# Patient Record
Sex: MALE | Race: WHITE | NOT HISPANIC OR LATINO | URBAN - METROPOLITAN AREA
[De-identification: names, ages, dates, MRNs, and addresses within clinical notes are randomized per-mention and may not be internally consistent; named-entity substitution may affect disease eponyms.]

---

## 2018-01-12 ENCOUNTER — EMERGENCY (EMERGENCY)
Facility: HOSPITAL | Age: 58
LOS: 1 days | Discharge: HOME | End: 2018-01-12
Attending: EMERGENCY MEDICINE | Admitting: EMERGENCY MEDICINE
Payer: MEDICARE

## 2018-01-12 VITALS
TEMPERATURE: 98 F | OXYGEN SATURATION: 97 % | SYSTOLIC BLOOD PRESSURE: 112 MMHG | DIASTOLIC BLOOD PRESSURE: 70 MMHG | HEART RATE: 67 BPM | RESPIRATION RATE: 16 BRPM

## 2018-01-12 VITALS
TEMPERATURE: 98 F | HEIGHT: 72 IN | DIASTOLIC BLOOD PRESSURE: 71 MMHG | RESPIRATION RATE: 16 BRPM | OXYGEN SATURATION: 97 % | SYSTOLIC BLOOD PRESSURE: 110 MMHG | WEIGHT: 195.11 LBS | HEART RATE: 68 BPM

## 2018-01-12 PROCEDURE — 99283 EMERGENCY DEPT VISIT LOW MDM: CPT | Mod: 25

## 2018-01-12 PROCEDURE — 96372 THER/PROPH/DIAG INJ SC/IM: CPT

## 2018-01-12 PROCEDURE — 99284 EMERGENCY DEPT VISIT MOD MDM: CPT

## 2018-01-12 RX ORDER — TRAMADOL HYDROCHLORIDE 50 MG/1
1 TABLET ORAL
Qty: 15 | Refills: 0
Start: 2018-01-12

## 2018-01-12 RX ORDER — KETOROLAC TROMETHAMINE 30 MG/ML
60 SYRINGE (ML) INJECTION ONCE
Qty: 0 | Refills: 0 | Status: DISCONTINUED | OUTPATIENT
Start: 2018-01-12 | End: 2018-01-12

## 2018-01-12 RX ADMIN — Medication 60 MILLIGRAM(S): at 18:02

## 2018-01-12 NOTE — ED ADULT NURSE NOTE - OBJECTIVE STATEMENT
back pain since last week, got medicine and was better, today I took the tree out and hurt my back again

## 2018-01-12 NOTE — ED PROVIDER NOTE - MEDICAL DECISION MAKING DETAILS
Pt with lumbar disk disease injured back last week. Improved with Steroid pack. Reinjured back yesterday while removing Elisabeth lights. Requesting Rx for steroids and pain meds. PE unrevealing. Plan RX steroids, NSAIDS, Tramadol. FU Neurologist on Monday as planned.

## 2018-01-12 NOTE — ED ADULT NURSE REASSESSMENT NOTE - NS ED NURSE REASSESS COMMENT FT1
patient's wound is tx with antibiotic ointment and dressing, denies any pain, swelling appears better, Pt is in no acute distress.

## 2018-01-12 NOTE — ED PROVIDER NOTE - OBJECTIVE STATEMENT
58 y/o M pt w/ PMHx chronic back pain, herniated disc (L4/L5) and two bulging discs no significant PMHx presents to the ED c/o increased lower back pain today. Pt states he went to urgent care for nasal drip, when he was getting out of the waiting room chair he started experiencing increased back pain, was Rxd Prednisone and Flexeril, states he was feeling better. Today, pt states he was taking down his samira tree and felt increased pain in his back again, took Flexeril PTA. States pain is worse with movement. Denies taking pain medication PTA. Pt states he has an appointment with his neurologist in 3 days, last time he was seen by neurologist was 1 1/2 years ago when an MRI was performed. Pt denies bladder or bowel dysfunction, numbness, tingling or any other complaints at this time.    Neurologist: Dr. Miryam Batres

## 2021-10-10 ENCOUNTER — EMERGENCY (EMERGENCY)
Facility: HOSPITAL | Age: 61
LOS: 1 days | Discharge: HOME | End: 2021-10-10
Attending: EMERGENCY MEDICINE | Admitting: EMERGENCY MEDICINE
Payer: MEDICARE

## 2021-10-10 VITALS
TEMPERATURE: 98 F | DIASTOLIC BLOOD PRESSURE: 73 MMHG | RESPIRATION RATE: 16 BRPM | HEART RATE: 68 BPM | WEIGHT: 184.97 LBS | HEIGHT: 72 IN | OXYGEN SATURATION: 97 % | SYSTOLIC BLOOD PRESSURE: 118 MMHG

## 2021-10-10 VITALS
DIASTOLIC BLOOD PRESSURE: 61 MMHG | TEMPERATURE: 99 F | SYSTOLIC BLOOD PRESSURE: 101 MMHG | OXYGEN SATURATION: 98 % | HEART RATE: 61 BPM | RESPIRATION RATE: 16 BRPM

## 2021-10-10 PROCEDURE — 99284 EMERGENCY DEPT VISIT MOD MDM: CPT

## 2021-10-10 PROCEDURE — 96372 THER/PROPH/DIAG INJ SC/IM: CPT

## 2021-10-10 PROCEDURE — 99283 EMERGENCY DEPT VISIT LOW MDM: CPT | Mod: 25

## 2021-10-10 RX ORDER — DIAZEPAM 5 MG
5 TABLET ORAL ONCE
Refills: 0 | Status: DISCONTINUED | OUTPATIENT
Start: 2021-10-10 | End: 2021-10-10

## 2021-10-10 RX ORDER — OXYCODONE AND ACETAMINOPHEN 5; 325 MG/1; MG/1
1 TABLET ORAL ONCE
Refills: 0 | Status: DISCONTINUED | OUTPATIENT
Start: 2021-10-10 | End: 2021-10-10

## 2021-10-10 RX ORDER — LIDOCAINE 4 G/100G
1 CREAM TOPICAL ONCE
Refills: 0 | Status: COMPLETED | OUTPATIENT
Start: 2021-10-10 | End: 2021-10-10

## 2021-10-10 RX ORDER — KETOROLAC TROMETHAMINE 30 MG/ML
30 SYRINGE (ML) INJECTION ONCE
Refills: 0 | Status: DISCONTINUED | OUTPATIENT
Start: 2021-10-10 | End: 2021-10-10

## 2021-10-10 RX ADMIN — Medication 30 MILLIGRAM(S): at 11:57

## 2021-10-10 RX ADMIN — LIDOCAINE 1 PATCH: 4 CREAM TOPICAL at 11:58

## 2021-10-10 RX ADMIN — Medication 30 MILLIGRAM(S): at 12:37

## 2021-10-10 RX ADMIN — OXYCODONE AND ACETAMINOPHEN 1 TABLET(S): 5; 325 TABLET ORAL at 14:00

## 2021-10-10 RX ADMIN — OXYCODONE AND ACETAMINOPHEN 1 TABLET(S): 5; 325 TABLET ORAL at 13:11

## 2021-10-10 RX ADMIN — Medication 5 MILLIGRAM(S): at 11:58

## 2021-10-10 NOTE — ED PROVIDER NOTE - PATIENT PORTAL LINK FT
You can access the FollowMyHealth Patient Portal offered by [facility] by registering at the following website: [email or web address]. By joining [employer]’[facility] FollowMyHealth portal, you will also be able to view your health information using other applications (apps) compatible with our system.

## 2021-10-10 NOTE — ED ADULT NURSE NOTE - NSICDXPASTMEDICALHX_GEN_ALL_CORE_FT
PAST MEDICAL HISTORY:  Depressive disorder     Lumbar herniated disc     Primary lateral sclerosis

## 2021-10-10 NOTE — ED ADULT NURSE NOTE - OBJECTIVE STATEMENT
62 yo male presents to the ED with complaints of lower mid back pain, pt states he has a hx of  herniated disc,  primary lateral sclerosis in which he uses a cane, reports waking up one morning and while getting out of bed he fekt a pain cant recall twisting  he went to  who sent him with naproxen and flexeril, pt states meds did not help with pain, reports unable to take a shower this morning as the pain was debilitating.

## 2021-10-10 NOTE — ED PROVIDER NOTE - NEUROLOGICAL, MLM
Alert and oriented, no focal deficits, no motor or sensory deficits. LE strength 5/5 bilateral LE. no saddle anesthesia. Patient ambulating with cane at baseline.

## 2021-10-10 NOTE — ED PROVIDER NOTE - CLINICAL SUMMARY MEDICAL DECISION MAKING FREE TEXT BOX
62 yo male with h/o primary lateral sclerosis, lumbar herniated/bulging discs presents to the ED c/o bilateral lower back pain after getting up from a chair yesterday. Patient admits to chronic back pain, has followed up with pain management for epidural injections and PT. Patient ambulates with cane at baseline. Patient tried taking naproxen and flexeril yesterday with minimal improvement. Denies fever, chills, chest pain, sob, abd pain, N/V, urinary sxs, flank pain. no UE/LE weakness or paresthesias, no saddle anesthesia, no bowel or bladder incontinence/retention, no prior back surgery, no h/o IVDA. no fall or trauma. PAtietn states pain is minimal at rest, worse when going from sitting to standing. PE: as above. a/p: no back pain red flag signs or symptoms. Pain improved with meds. Will dc with percocet. Recommend spine/ortho, pain management and PT follow up. Patient understands return precautions.

## 2021-10-10 NOTE — ED PROVIDER NOTE - CARE PROVIDER_API CALL
Mychal Lipscomb)  Orthopaedic Surgery  [address] [place] [address], [address]  [place] [address]  Phone: [phone]  Fax: [phone]  Follow Up Time: 1-3 Days

## 2021-10-10 NOTE — ED PROVIDER NOTE - MUSCULOSKELETAL MINIMAL EXAM
+ TTP bilateral lumbar paraspinal tenderness. no midline vertebral tenderness. decreased ROM of back due to pain. dp pulses equal and intact bilaterally.

## 2021-10-10 NOTE — ED PROVIDER NOTE - PROGRESS NOTE DETAILS
Patt WONG for ED attending, Dr. Fraser; 60 y/o w/ PMHx of primary lateral sclerosis, lumbar herniate disc presents to ED c/o lower back pain. Pt repots this pain is chronic that flares up when he moves the wrong way or does heavy lifting. Pt reports he went to pain management at [facility] where he was given epidurals that improved his pain. Pt takes Cymbalta at home. Pt does not take pain medication at home. Pt walks w/ cane at baseline. On exam: vital signs normal, afebrile and in no distress, no spinal tenderness full ROM intact, extremities non deformed nontender, neuro generalized weakness 2/2 ALS no acute focal finding. Impression exacerbation of chronic back pain. Plan pain medication and f/u neurology tomorrow as discussed.

## 2021-10-10 NOTE — ED PROVIDER NOTE - OBJECTIVE STATEMENT
62 yo male with h/o primary lateral sclerosis 60 yo male with h/o primary lateral sclerosis, lumbar herniated/bulging discs presents to the ED c/o bilateral lower back pain after getting up from a chair yesterday. Patient admits to chronic back pain, has followed up with pain management for epidural injections and PT. Patient ambulates with cane at baseline. Patient tried taking naproxen and flexeril yesterday with minimal improvement. Denies fever, chills, chest pain, sob, abd pain, N/V, urinary sxs, flank pain. no UE/LE weakness or paresthesias, no saddle anesthesia, no bowel or bladder incontinence/retention, no prior back surgery, no h/o IVDA. no fall or trauma. PAtietn states pain is minimal at rest, worse when going from sitting to standing.

## 2022-07-04 ENCOUNTER — INPATIENT (INPATIENT)
Facility: HOSPITAL | Age: 62
LOS: 2 days | Discharge: HOME | DRG: 392 | End: 2022-07-07
Attending: HOSPITALIST | Admitting: HOSPITALIST
Payer: MEDICARE

## 2022-07-04 VITALS
RESPIRATION RATE: 18 BRPM | TEMPERATURE: 98 F | OXYGEN SATURATION: 99 % | SYSTOLIC BLOOD PRESSURE: 121 MMHG | HEIGHT: 72 IN | HEART RATE: 77 BPM | WEIGHT: 191.8 LBS | DIASTOLIC BLOOD PRESSURE: 75 MMHG

## 2022-07-04 DIAGNOSIS — K57.92 DIVERTICULITIS OF INTESTINE, PART UNSPECIFIED, WITHOUT PERFORATION OR ABSCESS WITHOUT BLEEDING: ICD-10-CM

## 2022-07-04 LAB
ALBUMIN SERPL ELPH-MCNC: 3.3 G/DL — SIGNIFICANT CHANGE UP (ref 3.3–5)
ALP SERPL-CCNC: 61 U/L — SIGNIFICANT CHANGE UP (ref 30–120)
ALT FLD-CCNC: 22 U/L DA — SIGNIFICANT CHANGE UP (ref 10–60)
ANION GAP SERPL CALC-SCNC: 5 MMOL/L — SIGNIFICANT CHANGE UP (ref 5–17)
APPEARANCE UR: CLEAR — SIGNIFICANT CHANGE UP
AST SERPL-CCNC: 16 U/L — SIGNIFICANT CHANGE UP (ref 10–40)
BASOPHILS # BLD AUTO: 0.05 K/UL — SIGNIFICANT CHANGE UP (ref 0–0.2)
BASOPHILS NFR BLD AUTO: 0.5 % — SIGNIFICANT CHANGE UP (ref 0–2)
BILIRUB SERPL-MCNC: 0.5 MG/DL — SIGNIFICANT CHANGE UP (ref 0.2–1.2)
BILIRUB UR-MCNC: NEGATIVE — SIGNIFICANT CHANGE UP
BUN SERPL-MCNC: 21 MG/DL — SIGNIFICANT CHANGE UP (ref 7–23)
CALCIUM SERPL-MCNC: 9.2 MG/DL — SIGNIFICANT CHANGE UP (ref 8.4–10.5)
CHLORIDE SERPL-SCNC: 102 MMOL/L — SIGNIFICANT CHANGE UP (ref 96–108)
CO2 SERPL-SCNC: 28 MMOL/L — SIGNIFICANT CHANGE UP (ref 22–31)
COLOR SPEC: YELLOW — SIGNIFICANT CHANGE UP
CREAT SERPL-MCNC: 0.94 MG/DL — SIGNIFICANT CHANGE UP (ref 0.5–1.3)
DIFF PNL FLD: ABNORMAL
EGFR: 92 ML/MIN/1.73M2 — SIGNIFICANT CHANGE UP
EOSINOPHIL # BLD AUTO: 0.36 K/UL — SIGNIFICANT CHANGE UP (ref 0–0.5)
EOSINOPHIL NFR BLD AUTO: 3.4 % — SIGNIFICANT CHANGE UP (ref 0–6)
GLUCOSE SERPL-MCNC: 115 MG/DL — HIGH (ref 70–99)
GLUCOSE UR QL: NEGATIVE MG/DL — SIGNIFICANT CHANGE UP
HCT VFR BLD CALC: 36.1 % — LOW (ref 39–50)
HGB BLD-MCNC: 12.2 G/DL — LOW (ref 13–17)
IMM GRANULOCYTES NFR BLD AUTO: 0.4 % — SIGNIFICANT CHANGE UP (ref 0–1.5)
KETONES UR-MCNC: ABNORMAL
LEUKOCYTE ESTERASE UR-ACNC: ABNORMAL
LIDOCAIN IGE QN: 113 U/L — SIGNIFICANT CHANGE UP (ref 73–393)
LYMPHOCYTES # BLD AUTO: 1.61 K/UL — SIGNIFICANT CHANGE UP (ref 1–3.3)
LYMPHOCYTES # BLD AUTO: 15.2 % — SIGNIFICANT CHANGE UP (ref 13–44)
MCHC RBC-ENTMCNC: 29.7 PG — SIGNIFICANT CHANGE UP (ref 27–34)
MCHC RBC-ENTMCNC: 33.8 GM/DL — SIGNIFICANT CHANGE UP (ref 32–36)
MCV RBC AUTO: 87.8 FL — SIGNIFICANT CHANGE UP (ref 80–100)
MONOCYTES # BLD AUTO: 1.28 K/UL — HIGH (ref 0–0.9)
MONOCYTES NFR BLD AUTO: 12.1 % — SIGNIFICANT CHANGE UP (ref 2–14)
NEUTROPHILS # BLD AUTO: 7.22 K/UL — SIGNIFICANT CHANGE UP (ref 1.8–7.4)
NEUTROPHILS NFR BLD AUTO: 68.4 % — SIGNIFICANT CHANGE UP (ref 43–77)
NITRITE UR-MCNC: NEGATIVE — SIGNIFICANT CHANGE UP
NRBC # BLD: 0 /100 WBCS — SIGNIFICANT CHANGE UP (ref 0–0)
PH UR: 5 — SIGNIFICANT CHANGE UP (ref 5–8)
PLATELET # BLD AUTO: 163 K/UL — SIGNIFICANT CHANGE UP (ref 150–400)
POTASSIUM SERPL-MCNC: 4.3 MMOL/L — SIGNIFICANT CHANGE UP (ref 3.5–5.3)
POTASSIUM SERPL-SCNC: 4.3 MMOL/L — SIGNIFICANT CHANGE UP (ref 3.5–5.3)
PROT SERPL-MCNC: 7 G/DL — SIGNIFICANT CHANGE UP (ref 6–8.3)
PROT UR-MCNC: 15 MG/DL
RBC # BLD: 4.11 M/UL — LOW (ref 4.2–5.8)
RBC # FLD: 12.1 % — SIGNIFICANT CHANGE UP (ref 10.3–14.5)
SODIUM SERPL-SCNC: 135 MMOL/L — SIGNIFICANT CHANGE UP (ref 135–145)
SP GR SPEC: 1.02 — SIGNIFICANT CHANGE UP (ref 1.01–1.02)
UROBILINOGEN FLD QL: NEGATIVE MG/DL — SIGNIFICANT CHANGE UP
WBC # BLD: 10.56 K/UL — HIGH (ref 3.8–10.5)
WBC # FLD AUTO: 10.56 K/UL — HIGH (ref 3.8–10.5)

## 2022-07-04 PROCEDURE — 99223 1ST HOSP IP/OBS HIGH 75: CPT | Mod: AI

## 2022-07-04 PROCEDURE — 74177 CT ABD & PELVIS W/CONTRAST: CPT | Mod: 26,MA

## 2022-07-04 PROCEDURE — 99285 EMERGENCY DEPT VISIT HI MDM: CPT | Mod: FS

## 2022-07-04 PROCEDURE — 99223 1ST HOSP IP/OBS HIGH 75: CPT | Mod: FS

## 2022-07-04 RX ORDER — ONDANSETRON 8 MG/1
4 TABLET, FILM COATED ORAL EVERY 8 HOURS
Refills: 0 | Status: DISCONTINUED | OUTPATIENT
Start: 2022-07-04 | End: 2022-07-07

## 2022-07-04 RX ORDER — DULOXETINE HYDROCHLORIDE 30 MG/1
0 CAPSULE, DELAYED RELEASE ORAL
Qty: 0 | Refills: 0 | DISCHARGE

## 2022-07-04 RX ORDER — SODIUM CHLORIDE 9 MG/ML
1000 INJECTION, SOLUTION INTRAVENOUS
Refills: 0 | Status: DISCONTINUED | OUTPATIENT
Start: 2022-07-04 | End: 2022-07-06

## 2022-07-04 RX ORDER — DULOXETINE HYDROCHLORIDE 30 MG/1
20 CAPSULE, DELAYED RELEASE ORAL DAILY
Refills: 0 | Status: DISCONTINUED | OUTPATIENT
Start: 2022-07-04 | End: 2022-07-05

## 2022-07-04 RX ORDER — PIPERACILLIN AND TAZOBACTAM 4; .5 G/20ML; G/20ML
3.38 INJECTION, POWDER, LYOPHILIZED, FOR SOLUTION INTRAVENOUS ONCE
Refills: 0 | Status: COMPLETED | OUTPATIENT
Start: 2022-07-04 | End: 2022-07-04

## 2022-07-04 RX ORDER — CYCLOBENZAPRINE HYDROCHLORIDE 10 MG/1
0 TABLET, FILM COATED ORAL
Qty: 0 | Refills: 0 | DISCHARGE

## 2022-07-04 RX ORDER — PIPERACILLIN AND TAZOBACTAM 4; .5 G/20ML; G/20ML
3.38 INJECTION, POWDER, LYOPHILIZED, FOR SOLUTION INTRAVENOUS EVERY 8 HOURS
Refills: 0 | Status: DISCONTINUED | OUTPATIENT
Start: 2022-07-04 | End: 2022-07-07

## 2022-07-04 RX ORDER — ACETAMINOPHEN 500 MG
1000 TABLET ORAL ONCE
Refills: 0 | Status: COMPLETED | OUTPATIENT
Start: 2022-07-04 | End: 2022-07-05

## 2022-07-04 RX ORDER — FAMOTIDINE 10 MG/ML
20 INJECTION INTRAVENOUS ONCE
Refills: 0 | Status: COMPLETED | OUTPATIENT
Start: 2022-07-04 | End: 2022-07-04

## 2022-07-04 RX ORDER — SODIUM CHLORIDE 9 MG/ML
1000 INJECTION INTRAMUSCULAR; INTRAVENOUS; SUBCUTANEOUS ONCE
Refills: 0 | Status: COMPLETED | OUTPATIENT
Start: 2022-07-04 | End: 2022-07-04

## 2022-07-04 RX ADMIN — FAMOTIDINE 20 MILLIGRAM(S): 10 INJECTION INTRAVENOUS at 21:16

## 2022-07-04 RX ADMIN — SODIUM CHLORIDE 1000 MILLILITER(S): 9 INJECTION INTRAMUSCULAR; INTRAVENOUS; SUBCUTANEOUS at 21:16

## 2022-07-04 NOTE — PROGRESS NOTE ADULT - ASSESSMENT
Case discussed with ED attending.  Given duration of symptoms and relatively unremarkable blood work in conjunction with CT findings which do not include periappendiceal stranding, Acute appendicitis is unlikely.  Symptoms are secondary to acute uncomplicated sigmoid diverticulitis and should respond well to IV antibiotics  Acute surgical intervention would result in need of colostomy and given this is uncomplicated diverticulitis, acute surgical intervention is not warranted.  Recommend medical admission for IV antibiotics.  NPO for bowel rest.  GI consultation to coordinate outpt follow up and possible colonoscopy upon resolution of acute inflammatory process.  Started on Zosyn in ED which should be sufficient.  Consider transition to Oral antibiotics (Vantin/Flagyl) once symptoms and WBC resolve.  Will follow.   Formal consultation in AM.

## 2022-07-04 NOTE — ED PROVIDER NOTE - OBJECTIVE STATEMENT
61 y/o M with hx of depression presents with c/o abdominal pain x 4 days. Pt states that he has intermittent lower abdominal cramps since Thursday night after dinner. States that he ate out at [employer] with his family on 6/30 and his wife also had similar symptoms but states that his symptoms are still persistent. States that he feels bloated. States that he has not had a normal BM in 3-4 days. Took milk of magnesia and stool softener two days ago without much relief. Denies N/V, fever, blood in stool, hx of abdominal surgeries, urinary symptoms, recent travel.

## 2022-07-04 NOTE — H&P ADULT - NSICDXFAMILYHX_GEN_ALL_CORE_FT
FAMILY HISTORY:  Father  Still living? Unknown  FH: heart disease, Age at diagnosis: Age Unknown    Mother  Still living? Unknown  FH: colon cancer, Age at diagnosis: Age Unknown

## 2022-07-04 NOTE — PROGRESS NOTE ADULT - SUBJECTIVE AND OBJECTIVE BOX
62y gentleman presenting to ED with lower abdominal pain since Thursday.  Case discussed with  ED attending and labs and imaging personally reviewed.    Vital Signs Last 24 Hrs  T(C): 36.9 (04 Jul 2022 20:30), Max: 36.9 (04 Jul 2022 20:30)  T(F): 98.5 (04 Jul 2022 20:30), Max: 98.5 (04 Jul 2022 20:30)  HR: 77 (04 Jul 2022 20:30) (77 - 77)  BP: 121/75 (04 Jul 2022 20:30) (121/75 - 121/75)  BP(mean): --  RR: 18 (04 Jul 2022 20:30) (18 - 18)  SpO2: 99% (04 Jul 2022 20:30) (99% - 99%)                            12.2   10.56 )-----------( 163      ( 04 Jul 2022 21:15 )             36.1       07-04    135  |  102  |  21  ----------------------------<  115<H>  4.3   |  28  |  0.94    Ca    9.2      04 Jul 2022 21:15    TPro  7.0  /  Alb  3.3  /  TBili  0.5  /  DBili  x   /  AST  16  /  ALT  22  /  AlkPhos  61  07-04    ACC: [id number] EXAM:  CT ABDOMEN AND PELVIS IC                          PROCEDURE DATE:  07/04/2022          INTERPRETATION:  CLINICAL INFORMATION: Lower abdominal pain    COMPARISON: None.    CONTRAST/COMPLICATIONS:  IV Contrast: Omnipaque 350  90 cc administered   10 cc discarded  Oral Contrast: NONE  Complications: None reported at time of study completion    PROCEDURE:  CT of the Abdomen and Pelvis was performed.  Sagittal and coronal reformats were performed.    FINDINGS:  LOWER CHEST: Within normal limits.    LIVER: Within normal limits.  BILE DUCTS: Normal caliber.  GALLBLADDER: Within normal limits.  SPLEEN: 1.8 cm rounded hypodense lesion in the anterior spleen,   nonspecific, possibly a hemangioma, lymphangioma, or other benign   pathology.  PANCREAS: Within normal limits.  ADRENALS: Within normal limits.  KIDNEYS/URETERS: Small peripelvic renal cysts. No hydroureteronephrosis.    BLADDER: Mild anterior bladder wall thickening and adjacent fat   stranding, likely reactive.  REPRODUCTIVE ORGANS: Cystic dilatation of the seminal vesicles   bilaterally. Prostate gland is normal in size.    BOWEL: No bowel obstruction. Appendix is thick-walled, fluid-filled,   measures up to 10 mm in diameter. There is colonic diverticulosis, as   well as short segment sigmoid colon wall thickening (submucosal edema)   centered around an inflamed diverticulum with adjacent fat stranding and   trace free fluid, compatible with acute diverticulitis.  PERITONEUM: No ascites.  VESSELS: Retroaortic left renal vein.  RETROPERITONEUM/LYMPH NODES: No lymphadenopathy.  ABDOMINAL WALL: Moderate-sized fat-containing right inguinal hernia.  BONES: Degenerative changes.    IMPRESSION:  Acute diverticulitis of the sigmoid colon.    Fluid-filled, thick-walled appendix measuring up to 10 mm in diameter,   concerning for concurrent acute appendicitis.    Recommend surgical consult.    Cystic dilatation of the seminal vesicles.    --- End of Report ---            SASHA TAYLOR MD; Attending Radiologist  This document has been electronically signed. Jul 4 2022 10:44PM

## 2022-07-04 NOTE — H&P ADULT - NSHPREVIEWOFSYSTEMS_GEN_ALL_CORE
REVIEW OF SYSTEMS:  CONSTITUTIONAL:    no fever or weight loss or fatigue  EYES:    no eye pain or visual disturbances or discharge  ENMT:     no difficulty hearing or tinnitus or vertigo, no sinus or throat pain  NECK:    no pain or stiffness  RESPIRATORY:    no cough or wheezing or chills or hemoptysis, no shortness of breath  CARDIOVASCULAR:    no chest pain or palpitations or dizziness or leg swelling  GASTROINTESTINAL:    +abdominal pain, no nausea or vomiting or hematemesis, no diarrhea or constipation, no melena or hematochezia.  GENITOURINARY:    increase urinary frequency, no dysuria or hematuria or incontinence  NEUROLOGICAL:    no headaches or memory loss or loss of strength or numbness or tremors  SKIN:    no itching or burning or rashes or lesions   LYMPH NODES:    no enlarged glands  ENDOCRINE:    no heat or cold intolerance, no hair loss, no polydipsia or polyuria  MUSCULOSKELETAL:    chronic back pain, no joint pain or swelling, no muscle or extremity pain  PSYCHIATRIC:    no depression or anxiety or mood swings or difficulty sleeping  HEME/LYMPH:    no easy bruising or bleeding gums  ALLERGY AND IMMUNOLOGIC:    no hives or eczema

## 2022-07-04 NOTE — H&P ADULT - NSHPSOCIALHISTORY_GEN_ALL_CORE
- smokes cigars on rare occasions (last smoked >1 year ago)  - drinks socially  - no illegal drug use  - lives with family  - walks with a cane

## 2022-07-04 NOTE — ED PROVIDER NOTE - CLINICAL SUMMARY MEDICAL DECISION MAKING FREE TEXT BOX
No attg note:    62 y.o. M c/o 5d abd pain/cramping, mostly suprapubic area, some loose stools, no n/v, no fever, wife had some gi symptoms, but brief ad improved; on exam is wd, wn, nad; abd - soft, nd, +ttp across lower abd, mostly suprapubic, no guarding/rebound; skin c/d/i; MDM iv, hydrate, labs with mild elevation wbc, ct with acute diverticulitis and question of acute appendicitis, will admit, start abx, c/s surgeon

## 2022-07-04 NOTE — ED ADULT NURSE NOTE - OBJECTIVE STATEMENT
patient has c/o abdominal pain since past Thursday, c/o loose stool and pain, pt's mother had colon ca, patient has c/o abdominal pain since past Thursday, c/o loose stool and pain, pt's mother had colon ca, Patient denies sick contacts/ no fever/chills/cough at this time, denies SOB and no acute distress. denies /GI symptoms, IV accessed and tolerating. Labs drawn & sent results pending. will continue to monitor.

## 2022-07-04 NOTE — H&P ADULT - NSHPPHYSICALEXAM_GEN_ALL_CORE
PHYSICAL EXAM:  Vital Signs Last 24 Hrs  T(C): 36.9 (04 Jul 2022 20:30), Max: 36.9 (04 Jul 2022 20:30)  T(F): 98.5 (04 Jul 2022 20:30), Max: 98.5 (04 Jul 2022 20:30)  HR: 77 (04 Jul 2022 20:30) (77 - 77)  BP: 121/75 (04 Jul 2022 20:30) (121/75 - 121/75)  BP(mean): --  RR: 18 (04 Jul 2022 20:30) (18 - 18)  SpO2: 99% (04 Jul 2022 20:30) (99% - 99%)    GENERAL:     well-appearing male in NAD  HEAD:     atraumatic, normocephalic  EYES:     EOMI, conjunctiva and sclera clear  RESPIRATORY:     clear to auscultation bilaterally, no rales or rhonchi or wheezing or rubs  CARDIOVASCULAR:     regular rate and rhythm, no murmurs or rubs or gallops, 2+ peripheral pulses  GASTROINTESTINAL:     soft, minimally tender in suprapubic area, no guarding or rebound, nondistended, no hepatosplenomegaly palpated, bowel sounds present  EXTREMITIES:     no clubbing or cyanosis or edema  MUSCULOSKELETAL:     no joint pain or swelling or deformities  NERVOUS SYSTEM:     motor strength intact with 5/5 B/L upper and lower extremities, no gross sensory deficits  SKIN:     no rashes or lesions  PSYCH:     appropriate, alert and orientated x3, good concentration

## 2022-07-04 NOTE — H&P ADULT - NSHPLABSRESULTS_GEN_ALL_CORE
LABS:                        12.2<L>  10.56<H> )-----------( 163      ( 2022 21:15 )             36.1<L>    135    |  102    |  21     ----------------------------<  115<H>    2022 21:15  4.3     |  28     |  0.94         Ca 9.2           2022 21:15        TPro  7.0    /  Alb  3.3    /  TBili  0.5    /  DBili  x      /  AST  16     /  ALT  22     /  AlkPhos  61     2022 21:15      Urinalysis Basic - ( 2022 22:12 )    Color: Yellow / Appearance: Clear / S.025 / pH: x  Gluc: x / Ketone: Trace  / Bili: Negative / Urobili: Negative mg/dL   Blood: x / Protein: 15 mg/dL / Nitrite: Negative   Leuk Esterase: Trace / RBC: 0-2 /HPF / WBC 0-2   Sq Epi: x / Non Sq Epi: Few / Bacteria: x    Radiology:  < from: CT Abdomen and Pelvis w/ IV Cont (22 @ 22:00) >    IMPRESSION:  Acute diverticulitis of the sigmoid colon.    Fluid-filled, thick-walled appendix measuring up to 10 mm in diameter,   concerning for concurrent acute appendicitis.    Recommend surgical consult.    Cystic dilatation of the seminal vesicles.    < end of copied text >

## 2022-07-05 DIAGNOSIS — Z90.89 ACQUIRED ABSENCE OF OTHER ORGANS: Chronic | ICD-10-CM

## 2022-07-05 PROBLEM — F32.9 MAJOR DEPRESSIVE DISORDER, SINGLE EPISODE, UNSPECIFIED: Chronic | Status: ACTIVE | Noted: 2021-10-10

## 2022-07-05 PROBLEM — G12.23 PRIMARY LATERAL SCLEROSIS: Chronic | Status: ACTIVE | Noted: 2021-10-10

## 2022-07-05 PROBLEM — M51.26 OTHER INTERVERTEBRAL DISC DISPLACEMENT, LUMBAR REGION: Chronic | Status: ACTIVE | Noted: 2021-10-10

## 2022-07-05 LAB
ALBUMIN SERPL ELPH-MCNC: 3.1 G/DL — LOW (ref 3.3–5)
ALP SERPL-CCNC: 59 U/L — SIGNIFICANT CHANGE UP (ref 30–120)
ALT FLD-CCNC: 18 U/L DA — SIGNIFICANT CHANGE UP (ref 10–60)
ANION GAP SERPL CALC-SCNC: 7 MMOL/L — SIGNIFICANT CHANGE UP (ref 5–17)
APTT BLD: 33.1 SEC — SIGNIFICANT CHANGE UP (ref 27.5–35.5)
AST SERPL-CCNC: 14 U/L — SIGNIFICANT CHANGE UP (ref 10–40)
BASOPHILS # BLD AUTO: 0.04 K/UL — SIGNIFICANT CHANGE UP (ref 0–0.2)
BASOPHILS NFR BLD AUTO: 0.4 % — SIGNIFICANT CHANGE UP (ref 0–2)
BILIRUB SERPL-MCNC: 0.6 MG/DL — SIGNIFICANT CHANGE UP (ref 0.2–1.2)
BUN SERPL-MCNC: 16 MG/DL — SIGNIFICANT CHANGE UP (ref 7–23)
CALCIUM SERPL-MCNC: 8.7 MG/DL — SIGNIFICANT CHANGE UP (ref 8.4–10.5)
CHLORIDE SERPL-SCNC: 104 MMOL/L — SIGNIFICANT CHANGE UP (ref 96–108)
CO2 SERPL-SCNC: 28 MMOL/L — SIGNIFICANT CHANGE UP (ref 22–31)
CREAT SERPL-MCNC: 0.83 MG/DL — SIGNIFICANT CHANGE UP (ref 0.5–1.3)
EGFR: 100 ML/MIN/1.73M2 — SIGNIFICANT CHANGE UP
EOSINOPHIL # BLD AUTO: 0.36 K/UL — SIGNIFICANT CHANGE UP (ref 0–0.5)
EOSINOPHIL NFR BLD AUTO: 3.9 % — SIGNIFICANT CHANGE UP (ref 0–6)
GLUCOSE SERPL-MCNC: 95 MG/DL — SIGNIFICANT CHANGE UP (ref 70–99)
HCT VFR BLD CALC: 36.6 % — LOW (ref 39–50)
HCV AB S/CO SERPL IA: 0.15 S/CO — SIGNIFICANT CHANGE UP (ref 0–0.99)
HCV AB SERPL-IMP: SIGNIFICANT CHANGE UP
HGB BLD-MCNC: 12.4 G/DL — LOW (ref 13–17)
IMM GRANULOCYTES NFR BLD AUTO: 0.3 % — SIGNIFICANT CHANGE UP (ref 0–1.5)
INR BLD: 1.15 RATIO — SIGNIFICANT CHANGE UP (ref 0.88–1.16)
LYMPHOCYTES # BLD AUTO: 1.59 K/UL — SIGNIFICANT CHANGE UP (ref 1–3.3)
LYMPHOCYTES # BLD AUTO: 17.1 % — SIGNIFICANT CHANGE UP (ref 13–44)
MAGNESIUM SERPL-MCNC: 2.2 MG/DL — SIGNIFICANT CHANGE UP (ref 1.6–2.6)
MCHC RBC-ENTMCNC: 29.9 PG — SIGNIFICANT CHANGE UP (ref 27–34)
MCHC RBC-ENTMCNC: 33.9 GM/DL — SIGNIFICANT CHANGE UP (ref 32–36)
MCV RBC AUTO: 88.2 FL — SIGNIFICANT CHANGE UP (ref 80–100)
MONOCYTES # BLD AUTO: 1.1 K/UL — HIGH (ref 0–0.9)
MONOCYTES NFR BLD AUTO: 11.8 % — SIGNIFICANT CHANGE UP (ref 2–14)
NEUTROPHILS # BLD AUTO: 6.19 K/UL — SIGNIFICANT CHANGE UP (ref 1.8–7.4)
NEUTROPHILS NFR BLD AUTO: 66.5 % — SIGNIFICANT CHANGE UP (ref 43–77)
NRBC # BLD: 0 /100 WBCS — SIGNIFICANT CHANGE UP (ref 0–0)
PHOSPHATE SERPL-MCNC: 3.9 MG/DL — SIGNIFICANT CHANGE UP (ref 2.5–4.5)
PLATELET # BLD AUTO: 158 K/UL — SIGNIFICANT CHANGE UP (ref 150–400)
POTASSIUM SERPL-MCNC: 4.1 MMOL/L — SIGNIFICANT CHANGE UP (ref 3.5–5.3)
POTASSIUM SERPL-SCNC: 4.1 MMOL/L — SIGNIFICANT CHANGE UP (ref 3.5–5.3)
PROT SERPL-MCNC: 6.6 G/DL — SIGNIFICANT CHANGE UP (ref 6–8.3)
PROTHROM AB SERPL-ACNC: 13.3 SEC — SIGNIFICANT CHANGE UP (ref 10.5–13.4)
RBC # BLD: 4.15 M/UL — LOW (ref 4.2–5.8)
RBC # FLD: 12.2 % — SIGNIFICANT CHANGE UP (ref 10.3–14.5)
SARS-COV-2 RNA SPEC QL NAA+PROBE: SIGNIFICANT CHANGE UP
SODIUM SERPL-SCNC: 139 MMOL/L — SIGNIFICANT CHANGE UP (ref 135–145)
WBC # BLD: 9.31 K/UL — SIGNIFICANT CHANGE UP (ref 3.8–10.5)
WBC # FLD AUTO: 9.31 K/UL — SIGNIFICANT CHANGE UP (ref 3.8–10.5)

## 2022-07-05 PROCEDURE — 99233 SBSQ HOSP IP/OBS HIGH 50: CPT

## 2022-07-05 PROCEDURE — 99232 SBSQ HOSP IP/OBS MODERATE 35: CPT | Mod: FS

## 2022-07-05 RX ORDER — HEPARIN SODIUM 5000 [USP'U]/ML
5000 INJECTION INTRAVENOUS; SUBCUTANEOUS EVERY 12 HOURS
Refills: 0 | Status: DISCONTINUED | OUTPATIENT
Start: 2022-07-05 | End: 2022-07-07

## 2022-07-05 RX ORDER — DULOXETINE HYDROCHLORIDE 30 MG/1
30 CAPSULE, DELAYED RELEASE ORAL DAILY
Refills: 0 | Status: DISCONTINUED | OUTPATIENT
Start: 2022-07-06 | End: 2022-07-07

## 2022-07-05 RX ADMIN — DULOXETINE HYDROCHLORIDE 20 MILLIGRAM(S): 30 CAPSULE, DELAYED RELEASE ORAL at 22:04

## 2022-07-05 RX ADMIN — SODIUM CHLORIDE 100 MILLILITER(S): 9 INJECTION, SOLUTION INTRAVENOUS at 22:04

## 2022-07-05 RX ADMIN — PIPERACILLIN AND TAZOBACTAM 25 GRAM(S): 4; .5 INJECTION, POWDER, LYOPHILIZED, FOR SOLUTION INTRAVENOUS at 10:38

## 2022-07-05 RX ADMIN — PIPERACILLIN AND TAZOBACTAM 25 GRAM(S): 4; .5 INJECTION, POWDER, LYOPHILIZED, FOR SOLUTION INTRAVENOUS at 17:09

## 2022-07-05 RX ADMIN — Medication 400 MILLIGRAM(S): at 02:33

## 2022-07-05 RX ADMIN — SODIUM CHLORIDE 100 MILLILITER(S): 9 INJECTION, SOLUTION INTRAVENOUS at 02:37

## 2022-07-05 RX ADMIN — Medication 1000 MILLIGRAM(S): at 02:37

## 2022-07-05 RX ADMIN — DULOXETINE HYDROCHLORIDE 20 MILLIGRAM(S): 30 CAPSULE, DELAYED RELEASE ORAL at 14:58

## 2022-07-05 RX ADMIN — PIPERACILLIN AND TAZOBACTAM 200 GRAM(S): 4; .5 INJECTION, POWDER, LYOPHILIZED, FOR SOLUTION INTRAVENOUS at 01:00

## 2022-07-05 RX ADMIN — HEPARIN SODIUM 5000 UNIT(S): 5000 INJECTION INTRAVENOUS; SUBCUTANEOUS at 17:08

## 2022-07-05 NOTE — CONSULT NOTE ADULT - GASTROINTESTINAL
details… soft/nondistended/normal active bowel sounds/no rigidity/no organomegaly/no palpable marco antonio/tender/guarding

## 2022-07-05 NOTE — CONSULT NOTE ADULT - SUBJECTIVE AND OBJECTIVE BOX
Chief Complaint:  Patient is a 62y old  Male who presents with a chief complaint of abdominal pain -> diverticulitis (2022 09:59)    Lumbar herniated disc    Depressive disorder    Primary lateral sclerosis    History of tonsillectomy       HPI:  62M with herniated discs, primary lateral sclerosis (ambulates with a cane) who presents with abdominal pain.  Pain started on Thursday evening after he and his family went to dinner at [employer].  Reports that his wife was also feeling unwell with diarrhea and abdominal pain (all shared the same food).  Patient tried milk of magnesia thinking it was constipation but only had worsening abdominal pain.  Tried a stool softener but only had water and gas.  Yesterday patient said he felt slightly better but then the symptoms returned today.  Pain is described as an intermittent cramping/stabbing, rated as a 8-9/10, pain located in the suprapubic area with radiation to the flanks.  No other radiation.  No fevers.  No nausea/vomiting.  Patient said that he would have flatus which would improve his pain.  Has not had a solid BM but said he normally does not BMs daily.  Patient also reports that he feels like he has been urinating more often.  No other symptoms.  Denies any chest pain, SOB, light-headedness, dizziness.  In the ED, patient's BP was 121/75, HR 77, RR 18, 99% on RA, T 98.5F.  Labs showed a barely elevated WBC at 10.56, very slight anemia at 12.2,  CT A/P showed "acute diverticulitis of the sigmoid colon, fluid-filled, thick-walled appendix measuring up to 10mm in diameter, concerning for concurrent acute appendicitis."  Surgery was consulted and thought acute appendicitis was unlikely given timing and lack of periappendiceal stranding on CT.  Also felt that IV antibiotic was sufficient for uncomplicated acute diverticulitis.  Therefore patient is being admitted to medicine for IV antibiotics.  Patient currently on zosyn for diverticulitis and denies any acute abdominal pain (only has some soreness when palpated).         (2022 23:54)      bee stings (Swelling)  No Known Drug Allergies      DULoxetine 20 milliGRAM(s) Oral daily  heparin   Injectable 5000 Unit(s) SubCutaneous every 12 hours  lactated ringers. 1000 milliLiter(s) IV Continuous <Continuous>  ondansetron Injectable 4 milliGRAM(s) IV Push every 8 hours PRN  piperacillin/tazobactam IVPB.. 3.375 Gram(s) IV Intermittent every 8 hours        FAMILY HISTORY:  FH: colon cancer (Mother)    FH: heart disease (Father)          Review of Systems:    General:  No wt loss, fevers, chills, night sweats,fatigue,   Eyes:  Good vision, no reported pain  ENT:  No sore throat, pain, runny nose, dysphagia  CV:  No pain, palpitatioins, hypo/hypertension  Resp:  No dyspnea, cough, tachypnea, wheezing  GI:  No pain, No nausea, No vomiting, No diarrhea, No constipatiion, No weight loss, No fever, No pruritis, No rectal bleeding, No tarry stools, No dysphagia,  :  No pain, bleeding, incontinence, nocturia  Muscle:  No pain, weakness  Breast:  No pain, abscess, mass, discharge  Neuro:  No weakness, tingling, memory problems  Psych:  No fatigue, insomnia, mood problems, depression  Endocrine:  No polyuria, polydypsia, cold/heat intolerance  Heme:  No petechiae, ecchymosis, easy bruisability  Skin:  No rash, tattoos, scars, edema      Physical Exam:    Vital Signs:  Vital Signs Last 24 Hrs  T(C): 36.7 (2022 07:52), Max: 37.2 (2022 01:50)  T(F): 98.1 (2022 07:52), Max: 98.9 (2022 01:50)  HR: 57 (2022 07:52) (57 - 90)  BP: 130/79 (2022 07:52) (121/75 - 147/76)  BP(mean): --  RR: 15 (2022 07:52) (15 - 20)  SpO2: 98% (2022 07:52) (98% - 100%)  Daily Height in cm: 182.88 (2022 20:30)    Daily     General:  Appears stated age, well-groomed, well-nourished, no distress  HEENT:  NC/AT,  conjunctivae clear and pink, no thyromegaly, nodules, adenopathy, no JVD  Chest:  Full & symmetric excursion, no increased effort, breath sounds clear  Cardiovascular:  Regular rhythm, S1, S2, no murmur/rub/S3/S4, no abdominal bruit, no edema  Abdomen:  Soft, non-tender, non-distended, normoactive bowel sounds,  no masses ,no hepatosplenomeagaly, no signs of chronic liver disease  Extremities:  no cyanosis,clubbing or edema  Skin:  No rash/erythema/ecchymoses/petechiae/wounds/abscess/warm/dry  Neuro/Psych:  Alert, oriented, no asterixis, no tremor, no encephalopathy    Laboratory:                            12.4   9.31  )-----------( 158      ( 2022 07:59 )             36.6     07-05    139  |  104  |  16  ----------------------------<  95  4.1   |  28  |  0.83    Ca    8.7      2022 07:59  Phos  3.9     07-05  Mg     2.2     07-05    TPro  6.6  /  Alb  3.1<L>  /  TBili  0.6  /  DBili  x   /  AST  14  /  ALT  18  /  AlkPhos  59  07-05    LIVER FUNCTIONS - ( 2022 07:59 )  Alb: 3.1 g/dL / Pro: 6.6 g/dL / ALK PHOS: 59 U/L / ALT: 18 U/L DA / AST: 14 U/L / GGT: x           PT/INR - ( 2022 07:59 )   PT: 13.3 sec;   INR: 1.15 ratio         PTT - ( 2022 07:59 )  PTT:33.1 sec  Urinalysis Basic - ( 2022 22:12 )    Color: Yellow / Appearance: Clear / S.025 / pH: x  Gluc: x / Ketone: Trace  / Bili: Negative / Urobili: Negative mg/dL   Blood: x / Protein: 15 mg/dL / Nitrite: Negative   Leuk Esterase: Trace / RBC: 0-2 /HPF / WBC 0-2   Sq Epi: x / Non Sq Epi: Few / Bacteria: x      Amylase Serum--      Lipase [id number]       Ammonia--    Imaging:      Assessment:      Plan:       
  HPI:  62M with herniated discs, primary lateral sclerosis who presented to the hospital with cc of lower abd pain over past few days. Denies fever chills n/v/d CP SOB urinary symptoms.  CT A/P showed "acute diverticulitis of the sigmoid colon, fluid-filled, thick-walled appendix measuring up to 10mm in diameter, concerning for concurrent acute appendicitis. WBC 10.56     Infectious Disease consult was called to evaluate pt and for antibiotic management.    Past Medical & Surgical Hx:  PAST MEDICAL & SURGICAL HISTORY:  Lumbar herniated disc  Depressive disorder  Primary lateral sclerosis  History of tonsillectomy      Social History--  EtOH: denies  Tobacco: denies   Drug Use: denies    FAMILY HISTORY:  FH: colon cancer (Mother)    FH: heart disease (Father)      Allergies  bee stings (Swelling)  No Known Drug Allergies    Intolerances  NONE      Home/ Out patient  Medications :    Current Inpatient Medications :    ANTIBIOTICS:   piperacillin/tazobactam IVPB.. 3.375 Gram(s) IV Intermittent every 8 hours      OTHER RELEVANT MEDICATIONS :  DULoxetine 20 milliGRAM(s) Oral daily  heparin   Injectable 5000 Unit(s) SubCutaneous every 12 hours  lactated ringers. 1000 milliLiter(s) IV Continuous <Continuous>  ondansetron Injectable 4 milliGRAM(s) IV Push every 8 hours PRN    ROS:  CONSTITUTIONAL:  Negative fever or chills  EYES:  Negative  blurry vision or double vision  CARDIOVASCULAR:  Negative for chest pain or palpitations  RESPIRATORY:  Negative for cough, wheezing, or SOB   GASTROINTESTINAL:  Negative for nausea, vomiting, diarrhea, constipation, + abdominal pain  GENITOURINARY:  Negative frequency, urgency , dysuria or hematuria   NEUROLOGIC:  No headache, confusion, dizziness, lightheadedness  All other systems were reviewed and are negative    Physical Exam:  Vital Signs Last 24 Hrs  T(C): 36.7 (05 Jul 2022 07:52), Max: 37.2 (05 Jul 2022 01:50)  T(F): 98.1 (05 Jul 2022 07:52), Max: 98.9 (05 Jul 2022 01:50)  HR: 57 (05 Jul 2022 07:52) (57 - 90)  BP: 130/79 (05 Jul 2022 07:52) (121/75 - 147/76)  RR: 15 (05 Jul 2022 07:52) (15 - 20)  SpO2: 98% (05 Jul 2022 07:52) (98% - 100%)  Height (cm): 182.9 (07-04 @ 20:30)  Weight (kg): 87 (07-04 @ 20:30)  BMI (kg/m2): 26 (07-04 @ 20:30)  BSA (m2): 2.09 (07-04 @ 20:30)    General: no acute distress  Neck: supple, trachea midline  Lungs: clear, no wheeze/rhonchi  Cardiovascular: regular rate and rhythm, S1 S2  Abdomen: soft, lower abd tender, ND, bowel sounds normal  Neurological:  alert and oriented x3  Skin: no rash  Extremities: no edema    Labs:                         12.4   9.31  )-----------( 158      ( 05 Jul 2022 07:59 )             36.6   07-05    139  |  104  |  16  ----------------------------<  95  4.1   |  28  |  0.83    Ca    8.7      05 Jul 2022 07:59  Phos  3.9     07-05  Mg     2.2     07-05    TPro  6.6  /  Alb  3.1<L>  /  TBili  0.6  /  DBili  x   /  AST  14  /  ALT  18  /  AlkPhos  59  07-05      RECENT CULTURES:          RADIOLOGY & ADDITIONAL STUDIES:    ACC: [id number] EXAM:  CT ABDOMEN AND PELVIS IC                          PROCEDURE DATE:  07/04/2022          INTERPRETATION:  CLINICAL INFORMATION: Lower abdominal pain    COMPARISON: None.    CONTRAST/COMPLICATIONS:  IV Contrast: Omnipaque 350  90 cc administered   10 cc discarded  Oral Contrast: NONE  Complications: None reported at time of study completion    PROCEDURE:  CT of the Abdomen and Pelvis was performed.  Sagittal and coronal reformats were performed.    FINDINGS:  LOWER CHEST: Within normal limits.    LIVER: Within normal limits.  BILE DUCTS: Normal caliber.  GALLBLADDER: Within normal limits.  SPLEEN: 1.8 cm rounded hypodense lesion in the anterior spleen,   nonspecific, possibly a hemangioma, lymphangioma, or other benign   pathology.  PANCREAS: Within normal limits.  ADRENALS: Within normal limits.  KIDNEYS/URETERS: Small peripelvic renal cysts. No hydroureteronephrosis.    BLADDER: Mild anterior bladder wall thickening and adjacent fat   stranding, likely reactive.  REPRODUCTIVE ORGANS: Cystic dilatation of the seminal vesicles   bilaterally. Prostate gland is normal in size.    BOWEL: No bowel obstruction. Appendix is thick-walled, fluid-filled,   measures up to 10 mm in diameter. There is colonic diverticulosis, as   well as short segment sigmoid colon wall thickening (submucosal edema)   centered around an inflamed diverticulum with adjacent fat stranding and   trace free fluid, compatible with acute diverticulitis.  PERITONEUM: No ascites.  VESSELS: Retroaortic left renal vein.  RETROPERITONEUM/LYMPH NODES: No lymphadenopathy.  ABDOMINAL WALL: Moderate-sized fat-containing right inguinal hernia.  BONES: Degenerative changes.    IMPRESSION:  Acute diverticulitis of the sigmoid colon.    Fluid-filled, thick-walled appendix measuring up to 10 mm in diameter,   concerning for concurrent acute appendicitis.    Recommend surgical consult.    Cystic dilatation of the seminal vesicles.    Assessment :   62M with herniated discs, primary lateral sclerosis admitted with acute diverticulitis of the sigmoid colon  No concurrent acute appendicitis per surgery  Wbc normalised    Plan :   Cont Zosyn  Fu cultures  Trend temps and cbc  Serial abd exams  Diet per surgery/GI    Continue with present regiment .  Approptiate use of antibiotics and adverse effects reviewed.      I have discussed the above plan of care with patient in detail. He expressed understanding of the treatment plan . Risks, benefits and alternatives discussed in detail. I have asked if he has any questions or concerns and appropriately addressed them to the best of my ability .      > 45 minutes spent in direct patient care reviewing  the notes, lab data/ imaging , discussion with multidisciplinary team. All questions were addressed and answered to the best of my capacity .    Thank you for allowing me to participate in the care of your patient .      Lisa Donahue MD  Infectious Disease  [phone]
HPI:  62M with herniated discs, primary lateral sclerosis (ambulates with a cane) who presents with abdominal pain.  Pain started on Thursday evening after he and his family went to dinner at [employer].  Reports that his wife was also feeling unwell with diarrhea and abdominal pain (all shared the same food).  Patient tried milk of magnesia thinking it was constipation but only had worsening abdominal pain.  Tried a stool softener but only had water and gas.  Yesterday patient said he felt slightly better but then the symptoms returned today.  Pain is described as an intermittent cramping/stabbing, rated as a 8-9/10, pain located in the suprapubic area with radiation to the flanks.  No other radiation.  No fevers.  No nausea/vomiting.  Patient said that he would have flatus which would improve his pain.  Has not had a solid BM but said he normally does not BMs daily.  Patient also reports that he feels like he has been urinating more often.  No other symptoms.  Denies any chest pain, SOB, light-headedness, dizziness.  In the ED, patient's BP was 121/75, HR 77, RR 18, 99% on RA, T 98.5F.  Labs showed a barely elevated WBC at 10.56, very slight anemia at 12.2,  CT A/P showed "acute diverticulitis of the sigmoid colon, fluid-filled, thick-walled appendix measuring up to 10mm in diameter, concerning for concurrent acute appendicitis."  Surgery was consulted and thought acute appendicitis was unlikely given timing and lack of periappendiceal stranding on CT.  Also felt that IV antibiotic was sufficient for uncomplicated acute diverticulitis.  Therefore patient is being admitted to medicine for IV antibiotics.  Patient currently on zosyn for diverticulitis and denies any acute abdominal pain (only has some soreness when palpated).      Last colonoscopy 7 yrs ago (Dr Mendoza). Pt reports it was his second colonoscopy, both of which were normal. Family history of Colon CA(mother)       (2022 23:54)      PAST MEDICAL & SURGICAL HISTORY:  Lumbar herniated disc      Depressive disorder      Primary lateral sclerosis      History of tonsillectomy          MEDICATIONS  (STANDING):  DULoxetine 20 milliGRAM(s) Oral daily  lactated ringers. 1000 milliLiter(s) (100 mL/Hr) IV Continuous <Continuous>  piperacillin/tazobactam IVPB.. 3.375 Gram(s) IV Intermittent every 8 hours    MEDICATIONS  (PRN):  ondansetron Injectable 4 milliGRAM(s) IV Push every 8 hours PRN Nausea and/or Vomiting      Allergies    bee stings (Swelling)  No Known Drug Allergies    Intolerances        SOCIAL HISTORY:      FAMILY HISTORY:  FH: colon cancer (Mother)    FH: heart disease (Father)        Vital Signs Last 24 Hrs  T(C): 36.7 (2022 07:52), Max: 37.2 (2022 01:50)  T(F): 98.1 (2022 07:52), Max: 98.9 (2022 01:50)  HR: 57 (2022 07:52) (57 - 90)  BP: 130/79 (2022 07:52) (121/75 - 147/76)  BP(mean): --  RR: 15 (2022 07:52) (15 - 20)  SpO2: 98% (2022 07:52) (98% - 100%)    LABS:                        12.4   9.31  )-----------( 158      ( 2022 07:59 )             36.6     07-05    139  |  104  |  16  ----------------------------<  95  4.1   |  28  |  0.83    Ca    8.7      2022 07:59    TPro  6.6  /  Alb  3.1<L>  /  TBili  0.6  /  DBili  x   /  AST  14  /  ALT  18  /  AlkPhos  59  07-05    PT/INR - ( 2022 07:59 )   PT: 13.3 sec;   INR: 1.15 ratio         PTT - ( 2022 07:59 )  PTT:33.1 sec  Urinalysis Basic - ( 2022 22:12 )    Color: Yellow / Appearance: Clear / S.025 / pH: x  Gluc: x / Ketone: Trace  / Bili: Negative / Urobili: Negative mg/dL   Blood: x / Protein: 15 mg/dL / Nitrite: Negative   Leuk Esterase: Trace / RBC: 0-2 /HPF / WBC 0-2   Sq Epi: x / Non Sq Epi: Few / Bacteria: x        RADIOLOGY & ADDITIONAL STUDIES:  ACC: [id number] EXAM:  CT ABDOMEN AND PELVIS IC                          PROCEDURE DATE:  2022          INTERPRETATION:  CLINICAL INFORMATION: Lower abdominal pain    COMPARISON: None.    CONTRAST/COMPLICATIONS:  IV Contrast: Omnipaque 350  90 cc administered   10 cc discarded  Oral Contrast: NONE  Complications: None reported at time of study completion    PROCEDURE:  CT of the Abdomen and Pelvis was performed.  Sagittal and coronal reformats were performed.    FINDINGS:  LOWER CHEST: Within normal limits.    LIVER: Within normal limits.  BILE DUCTS: Normal caliber.  GALLBLADDER: Within normal limits.  SPLEEN: 1.8 cm rounded hypodense lesion in the anterior spleen,   nonspecific, possibly a hemangioma, lymphangioma, or other benign   pathology.  PANCREAS: Within normal limits.  ADRENALS: Within normal limits.  KIDNEYS/URETERS: Small peripelvic renal cysts. No hydroureteronephrosis.    BLADDER: Mild anterior bladder wall thickening and adjacent fat   stranding, likely reactive.  REPRODUCTIVE ORGANS: Cystic dilatation of the seminal vesicles   bilaterally. Prostate gland is normal in size.    BOWEL: No bowel obstruction. Appendix is thick-walled, fluid-filled,   measures up to 10 mm in diameter. There is colonic diverticulosis, as   well as short segment sigmoid colon wall thickening (submucosal edema)   centered around an inflamed diverticulum with adjacent fat stranding and   trace free fluid, compatible with acute diverticulitis.  PERITONEUM: No ascites.  VESSELS: Retroaortic left renal vein.  RETROPERITONEUM/LYMPH NODES: No lymphadenopathy.  ABDOMINAL WALL: Moderate-sized fat-containing right inguinal hernia.  BONES: Degenerative changes.    IMPRESSION:  Acute diverticulitis of the sigmoid colon.    Fluid-filled, thick-walled appendix measuring up to 10 mm in diameter,   concerning for concurrent acute appendicitis.    Recommend surgical consult.    Cystic dilatation of the seminal vesicles.    --- End of Report ---            SASHA TAYLOR MD; Attending Radiologist  This document has been electronically signed. 2022 10:44PM

## 2022-07-05 NOTE — PROGRESS NOTE ADULT - ASSESSMENT
62M with herniated discs, primary lateral sclerosis (ambulates with a cane) who presents with abdominal pain.   Found to have diverticulitis on CT.      Diverticulitis   - appendicitis unlikely as per surgery  - surgery input appreciated   - cont with zosyn   - IV fluids  - NPO for today, plan to advance tomorrow per surgery  - pain control  - Follow up ID input    Incidental findings   - Cystic dilatation of the seminal vesicles noted on imaging  - Outpatient PCP/urology follow up    Back pain  - cont with cymbalta  - tylenol PRN    Preventive measures  - Heparin bid

## 2022-07-05 NOTE — CONSULT NOTE ADULT - ASSESSMENT
acute uncomplicated diverticulitis    -clear liquid diet     cont w/ antibiotic cousrse; total 10-14 days     suggest probiotic for the next 4 months     serial abdominal exams; will advance diet as tolerated     WBC improved, repeat labs in am w/ CMP/CBC   d/w pt need for outpt followup with his primary GI re colonoscopy in the next 1-2 months   appendix findings per surgery   
62y gentleman with 4 day history of abdominal pain after eating out with family.  Several members with upset stomach, however his symptoms persisted without nausea or vomiting.  Has had loose stool after taking stool softeners  Has increased sensation to evacuate but finds difficult to do so.  No previous history of GI symptoms aside from mild constipation.  Has had 2 prior colonoscopy which he reports as normal.    CT findings suggestive of acute uncomplicated sigmoid diverticulitis.  Appendix dilated to 1 cm, fluid filled but without periappendiceal stranding.  As such, given 4 day history of symptoms does not likely represent acute appendicitis particularly in the face of clear sigmoid diverticulitis.    No acute surgical intervention indicated.  Recommend medical management of Sigmoid diverticulitis.  NPO today for bowel rest.  Will consider advancing to clears as symptoms regress and WBC normalizes.  IV antibiotics (consider 72 hrs IV prior to transition to Oral pending resolution of symptoms)    I've discussed pathophysiology, anticipated hospital course and role/timing of surgery with the patient and all questions answered.  He will need to f/u with his GI for output colonoscopy 6-8 weeks after resolution of symptoms.    If symptoms fail to resolve or worsen, may need urgent surgical intervention which may result in need for colostomy.   Fortunately this appears to be uncomplicated diverticulitis and I anticipate a good response to antibiotics and avoiding surgery on this admission.    Will follow closely with further recommendations as clinical course unfolds.

## 2022-07-05 NOTE — CONSULT NOTE ADULT - CONSULT REASON
Acute sigmoid diverticulitis
Abdominal pain and abnormal CT findings:  Sigmoid diverticulitis and dilated appendix (r/o Appendicitis)

## 2022-07-05 NOTE — CONSULT NOTE ADULT - CARDIOVASCULAR
Impression: Visual distortion, OS. Status: Symptomatic. Plan: No interval changes. The patient notes a gray patch in her nasal field OS, which she feels is lightening slightly. VA has worsened from 20/30 to 20/400 OS following surgery. Exam and OCT reveal macular thickening OS (246 microns, from 405 microns). An IVFA from 06/01/2022 demonstrated no RVO or leakage. Fundus Photos from 06/01/2022 demonstrated a normal appearing fundus. I see no obvious retinal cause for the decreased VA, the surgical procedure was uneventful. The differential diagnosis includes nghia-operative NAION vs anesthesia block toxicity vs reperfused BRAO. Has seen Dr. Leann Andres at [facility]. Observe retina. Thanks, Anuj Del Valle Return in 12 months for follow up, OCT OU, IVFA OS 1st negative normal/regular rate and rhythm/S1 S2 present/no gallops/no rub/no murmur

## 2022-07-05 NOTE — PROGRESS NOTE ADULT - SUBJECTIVE AND OBJECTIVE BOX
Patient is a 62y old  Male who presents with a chief complaint of abdominal pain -> diverticulitis (2022 08:55)      INTERVAL HPI/OVERNIGHT EVENTS: Patient seen and examined at bedside. No overnight events. Feels better this morning. Abd pain improving     MEDICATIONS  (STANDING):  DULoxetine 20 milliGRAM(s) Oral daily  lactated ringers. 1000 milliLiter(s) (100 mL/Hr) IV Continuous <Continuous>  piperacillin/tazobactam IVPB.. 3.375 Gram(s) IV Intermittent every 8 hours    MEDICATIONS  (PRN):  ondansetron Injectable 4 milliGRAM(s) IV Push every 8 hours PRN Nausea and/or Vomiting      Allergies    bee stings (Swelling)  No Known Drug Allergies    Intolerances        REVIEW OF SYSTEMS:  CONSTITUTIONAL: No fever or chills  HEENT:  No headache, no sore throat  RESPIRATORY: No cough, wheezing, or shortness of breath  CARDIOVASCULAR: No chest pain, palpitations, or leg swelling  GASTROINTESTINAL: admits mild abd pain, no nausea, vomiting, or diarrhea  GENITOURINARY: No dysuria, frequency, or hematuria  NEUROLOGICAL: no focal weakness or dizziness  MUSCULOSKELETAL: no myalgias     Vital Signs Last 24 Hrs  T(C): 36.7 (2022 07:52), Max: 37.2 (2022 01:50)  T(F): 98.1 (2022 07:52), Max: 98.9 (2022 01:50)  HR: 57 (2022 07:52) (57 - 90)  BP: 130/79 (2022 07:52) (121/75 - 147/76)  BP(mean): --  RR: 15 (2022 07:52) (15 - 20)  SpO2: 98% (2022 07:52) (98% - 100%)    PHYSICAL EXAM:  GENERAL: NAD  HEENT:  NCAT, moist mucous membranes  CHEST/LUNG:  CTA b/l, no rales, wheezes, or rhonchi  HEART:  RRR, S1, S2  ABDOMEN:  BS+, soft, very mild tenderness to deep palpation, nondistended  EXTREMITIES: no edema, cyanosis, or calf tenderness  NERVOUS SYSTEM: AA&Ox3, sensation intact    LABS:                        12.4   9.31  )-----------( 158      ( 2022 07:59 )             36.6     CBC Full  -  ( 2022 07:59 )  WBC Count : 9.31 K/uL  Hemoglobin : 12.4 g/dL  Hematocrit : 36.6 %  Platelet Count - Automated : 158 K/uL  Mean Cell Volume : 88.2 fl  Mean Cell Hemoglobin : 29.9 pg  Mean Cell Hemoglobin Concentration : 33.9 gm/dL  Auto Neutrophil # : 6.19 K/uL  Auto Lymphocyte # : 1.59 K/uL  Auto Monocyte # : 1.10 K/uL  Auto Eosinophil # : 0.36 K/uL  Auto Basophil # : 0.04 K/uL  Auto Neutrophil % : 66.5 %  Auto Lymphocyte % : 17.1 %  Auto Monocyte % : 11.8 %  Auto Eosinophil % : 3.9 %  Auto Basophil % : 0.4 %    2022 07:59    139    |  104    |  16     ----------------------------<  95     4.1     |  28     |  0.83     Ca    8.7        2022 07:59  Phos  3.9       2022 07:59  Mg     2.2       2022 07:59    TPro  6.6    /  Alb  3.1    /  TBili  0.6    /  DBili  x      /  AST  14     /  ALT  18     /  AlkPhos  59     2022 07:59    PT/INR - ( 2022 07:59 )   PT: 13.3 sec;   INR: 1.15 ratio         PTT - ( 2022 07:59 )  PTT:33.1 sec  Urinalysis Basic - ( 2022 22:12 )    Color: Yellow / Appearance: Clear / S.025 / pH: x  Gluc: x / Ketone: Trace  / Bili: Negative / Urobili: Negative mg/dL   Blood: x / Protein: 15 mg/dL / Nitrite: Negative   Leuk Esterase: Trace / RBC: 0-2 /HPF / WBC 0-2   Sq Epi: x / Non Sq Epi: Few / Bacteria: x      CAPILLARY BLOOD GLUCOSE              RADIOLOGY & ADDITIONAL TESTS: < from: CT Abdomen and Pelvis w/ IV Cont (22 @ 22:00) >    ACC: [id number] EXAM:  CT ABDOMEN AND PELVIS IC                          PROCEDURE DATE:  2022          INTERPRETATION:  CLINICAL INFORMATION: Lower abdominal pain    COMPARISON: None.    CONTRAST/COMPLICATIONS:  IV Contrast: Omnipaque 350  90 cc administered   10 cc discarded  Oral Contrast: NONE  Complications: None reported at time of study completion    PROCEDURE:  CT of the Abdomen and Pelvis was performed.  Sagittal and coronal reformats were performed.    FINDINGS:  LOWER CHEST: Within normal limits.    LIVER: Within normal limits.  BILE DUCTS: Normal caliber.  GALLBLADDER: Within normal limits.  SPLEEN: 1.8 cm rounded hypodense lesion in the anterior spleen,   nonspecific, possibly a hemangioma, lymphangioma, or other benign   pathology.  PANCREAS: Within normal limits.  ADRENALS: Within normal limits.  KIDNEYS/URETERS: Small peripelvic renal cysts. No hydroureteronephrosis.    BLADDER: Mild anterior bladder wall thickening and adjacent fat   stranding, likely reactive.  REPRODUCTIVE ORGANS: Cystic dilatation of the seminal vesicles   bilaterally. Prostate gland is normal in size.    BOWEL: No bowel obstruction. Appendix is thick-walled, fluid-filled,   measures up to 10 mm in diameter. There is colonic diverticulosis, as   well as short segment sigmoid colon wall thickening (submucosal edema)   centered around an inflamed diverticulum with adjacent fat stranding and   trace free fluid, compatible with acute diverticulitis.  PERITONEUM: No ascites.  VESSELS: Retroaortic left renal vein.  RETROPERITONEUM/LYMPH NODES: No lymphadenopathy.  ABDOMINAL WALL: Moderate-sized fat-containing right inguinal hernia.  BONES: Degenerative changes.    IMPRESSION:  Acute diverticulitis of the sigmoid colon.    Fluid-filled, thick-walled appendix measuring up to 10 mm in diameter,   concerning for concurrent acute appendicitis.    Recommend surgical consult.    Cystic dilatation of the seminal vesicles.    --- End of Report ---            SASHA TAYLOR MD; Attending Radiologist  This document has been electronically signed. 2022 10:44PM    < end of copied text >      Consultant(s) Notes Reviewed:  [x] YES  [ ] NO    Care Discussed with [x] Consultants  [x] Patient  [ ] Family  [ ]      [ x] Other; RN  DVT ppx

## 2022-07-05 NOTE — CONSULT NOTE ADULT - NEGATIVE GASTROINTESTINAL SYMPTOMS
no nausea/no vomiting/no constipation/no melena/no hematochezia/no steatorrhea/no jaundice/no hiccoughs

## 2022-07-06 DIAGNOSIS — K57.92 DIVERTICULITIS OF INTESTINE, PART UNSPECIFIED, WITHOUT PERFORATION OR ABSCESS WITHOUT BLEEDING: ICD-10-CM

## 2022-07-06 LAB
ANION GAP SERPL CALC-SCNC: 9 MMOL/L — SIGNIFICANT CHANGE UP (ref 5–17)
BUN SERPL-MCNC: 12 MG/DL — SIGNIFICANT CHANGE UP (ref 7–23)
CALCIUM SERPL-MCNC: 9.2 MG/DL — SIGNIFICANT CHANGE UP (ref 8.4–10.5)
CHLORIDE SERPL-SCNC: 101 MMOL/L — SIGNIFICANT CHANGE UP (ref 96–108)
CO2 SERPL-SCNC: 28 MMOL/L — SIGNIFICANT CHANGE UP (ref 22–31)
CREAT SERPL-MCNC: 0.79 MG/DL — SIGNIFICANT CHANGE UP (ref 0.5–1.3)
CULTURE RESULTS: SIGNIFICANT CHANGE UP
EGFR: 101 ML/MIN/1.73M2 — SIGNIFICANT CHANGE UP
GLUCOSE SERPL-MCNC: 77 MG/DL — SIGNIFICANT CHANGE UP (ref 70–99)
HCT VFR BLD CALC: 40.4 % — SIGNIFICANT CHANGE UP (ref 39–50)
HGB BLD-MCNC: 13.3 G/DL — SIGNIFICANT CHANGE UP (ref 13–17)
MCHC RBC-ENTMCNC: 29.2 PG — SIGNIFICANT CHANGE UP (ref 27–34)
MCHC RBC-ENTMCNC: 32.9 GM/DL — SIGNIFICANT CHANGE UP (ref 32–36)
MCV RBC AUTO: 88.8 FL — SIGNIFICANT CHANGE UP (ref 80–100)
NRBC # BLD: 0 /100 WBCS — SIGNIFICANT CHANGE UP (ref 0–0)
PLATELET # BLD AUTO: 187 K/UL — SIGNIFICANT CHANGE UP (ref 150–400)
POTASSIUM SERPL-MCNC: 4 MMOL/L — SIGNIFICANT CHANGE UP (ref 3.5–5.3)
POTASSIUM SERPL-SCNC: 4 MMOL/L — SIGNIFICANT CHANGE UP (ref 3.5–5.3)
RBC # BLD: 4.55 M/UL — SIGNIFICANT CHANGE UP (ref 4.2–5.8)
RBC # FLD: 11.9 % — SIGNIFICANT CHANGE UP (ref 10.3–14.5)
SODIUM SERPL-SCNC: 138 MMOL/L — SIGNIFICANT CHANGE UP (ref 135–145)
SPECIMEN SOURCE: SIGNIFICANT CHANGE UP
WBC # BLD: 7.07 K/UL — SIGNIFICANT CHANGE UP (ref 3.8–10.5)
WBC # FLD AUTO: 7.07 K/UL — SIGNIFICANT CHANGE UP (ref 3.8–10.5)

## 2022-07-06 PROCEDURE — 99231 SBSQ HOSP IP/OBS SF/LOW 25: CPT | Mod: FS

## 2022-07-06 PROCEDURE — 99232 SBSQ HOSP IP/OBS MODERATE 35: CPT

## 2022-07-06 RX ADMIN — PIPERACILLIN AND TAZOBACTAM 25 GRAM(S): 4; .5 INJECTION, POWDER, LYOPHILIZED, FOR SOLUTION INTRAVENOUS at 01:39

## 2022-07-06 RX ADMIN — PIPERACILLIN AND TAZOBACTAM 25 GRAM(S): 4; .5 INJECTION, POWDER, LYOPHILIZED, FOR SOLUTION INTRAVENOUS at 18:10

## 2022-07-06 RX ADMIN — HEPARIN SODIUM 5000 UNIT(S): 5000 INJECTION INTRAVENOUS; SUBCUTANEOUS at 05:37

## 2022-07-06 RX ADMIN — DULOXETINE HYDROCHLORIDE 30 MILLIGRAM(S): 30 CAPSULE, DELAYED RELEASE ORAL at 21:35

## 2022-07-06 RX ADMIN — PIPERACILLIN AND TAZOBACTAM 25 GRAM(S): 4; .5 INJECTION, POWDER, LYOPHILIZED, FOR SOLUTION INTRAVENOUS at 09:32

## 2022-07-06 RX ADMIN — HEPARIN SODIUM 5000 UNIT(S): 5000 INJECTION INTRAVENOUS; SUBCUTANEOUS at 18:10

## 2022-07-06 NOTE — PROGRESS NOTE ADULT - SUBJECTIVE AND OBJECTIVE BOX
Patient is a 62y old  Male who presents with a chief complaint of abdominal pain -> diverticulitis (2022 09:59)      INTERVAL HPI/OVERNIGHT EVENTS: Patient seen and examined at bedside. No overnight events. Feeling well this morning. Tolerating CLD. Abd pain improved     MEDICATIONS  (STANDING):  DULoxetine 30 milliGRAM(s) Oral daily  heparin   Injectable 5000 Unit(s) SubCutaneous every 12 hours  lactated ringers. 1000 milliLiter(s) (100 mL/Hr) IV Continuous <Continuous>  piperacillin/tazobactam IVPB.. 3.375 Gram(s) IV Intermittent every 8 hours    MEDICATIONS  (PRN):  ondansetron Injectable 4 milliGRAM(s) IV Push every 8 hours PRN Nausea and/or Vomiting      Allergies    bee stings (Swelling)  No Known Drug Allergies    Intolerances        REVIEW OF SYSTEMS:  CONSTITUTIONAL: No fever or chills  HEENT:  No headache, no sore throat  RESPIRATORY: No cough, wheezing, or shortness of breath  CARDIOVASCULAR: No chest pain, palpitations, or leg swelling  GASTROINTESTINAL: No abd pain, nausea, vomiting, or diarrhea  GENITOURINARY: No dysuria, frequency, or hematuria  NEUROLOGICAL: no focal weakness or dizziness  MUSCULOSKELETAL: no myalgias     Vital Signs Last 24 Hrs  T(C): 36.7 (2022 07:39), Max: 36.7 (2022 15:10)  T(F): 98.1 (2022 07:39), Max: 98.1 (2022 07:39)  HR: 59 (2022 07:39) (53 - 59)  BP: 123/70 (2022 07:39) (123/70 - 134/57)  BP(mean): --  RR: 20 (2022 07:39) (18 - 20)  SpO2: 96% (2022 07:39) (96% - 98%)    PHYSICAL EXAM:  GENERAL: NAD  HEENT:  NCAT, moist mucous membranes  CHEST/LUNG:  CTA b/l, no rales, wheezes, or rhonchi  HEART:  RRR, S1, S2  ABDOMEN:  BS+, soft, nontender, nondistended  EXTREMITIES: no edema, cyanosis, or calf tenderness  NERVOUS SYSTEM: AA&Ox3, sensation intact    LABS:                        13.3   7.07  )-----------( 187      ( 2022 06:00 )             40.4     CBC Full  -  ( 2022 06:00 )  WBC Count : 7.07 K/uL  Hemoglobin : 13.3 g/dL  Hematocrit : 40.4 %  Platelet Count - Automated : 187 K/uL  Mean Cell Volume : 88.8 fl  Mean Cell Hemoglobin : 29.2 pg  Mean Cell Hemoglobin Concentration : 32.9 gm/dL  Auto Neutrophil # : x  Auto Lymphocyte # : x  Auto Monocyte # : x  Auto Eosinophil # : x  Auto Basophil # : x  Auto Neutrophil % : x  Auto Lymphocyte % : x  Auto Monocyte % : x  Auto Eosinophil % : x  Auto Basophil % : x    2022 06:00    138    |  101    |  12     ----------------------------<  77     4.0     |  28     |  0.79     Ca    9.2        2022 06:00      PT/INR - ( 2022 07:59 )   PT: 13.3 sec;   INR: 1.15 ratio         PTT - ( 2022 07:59 )  PTT:33.1 sec  Urinalysis Basic - ( 2022 22:12 )    Color: Yellow / Appearance: Clear / S.025 / pH: x  Gluc: x / Ketone: Trace  / Bili: Negative / Urobili: Negative mg/dL   Blood: x / Protein: 15 mg/dL / Nitrite: Negative   Leuk Esterase: Trace / RBC: 0-2 /HPF / WBC 0-2   Sq Epi: x / Non Sq Epi: Few / Bacteria: x      CAPILLARY BLOOD GLUCOSE            Culture - Urine (collected 22 @ 08:46)  Source: Clean Catch Clean Catch (Midstream)  Final Report (22 @ 10:29):    <10,000 CFU/mL Normal Urogenital Araceli        RADIOLOGY & ADDITIONAL TESTS:     Consultant(s) Notes Reviewed:  [x] YES  [ ] NO    Care Discussed with [x] Consultants  [x] Patient  [ ] Family  [ ]      [ x] Other; RN  DVT ppx

## 2022-07-06 NOTE — PROGRESS NOTE ADULT - ASSESSMENT
acute uncomplicated diverticulitis    -clear liquid diet tolerated   advance to low residue diet today     cont w/ antibiotic cousrse; total 10-14 days       serial abdominal exams d/w pt need for outpt followup with his primary GI re colonoscopy in the next 1-2 months   appendix findings per surgery  OOB/chair   discharge planning

## 2022-07-06 NOTE — PROGRESS NOTE ADULT - SUBJECTIVE AND OBJECTIVE BOX
Hospital day: 2    62y Male admitted with Diverticulitis of intestine without perforation or abscess without bleeding      Patient seen and examined bedside resting comfortably.  States he is doing well.  Tolerating a clear liquid diet.  Ambulating. Passing flatus.  States pain has subsided.  No overnight events.     T(F): 98.1 (07-06-22 @ 07:39), Max: 98.1 (07-06-22 @ 07:39)  HR: 59 (07-06-22 @ 07:39) (53 - 59)  BP: 123/70 (07-06-22 @ 07:39) (123/70 - 134/57)  RR: 20 (07-06-22 @ 07:39) (18 - 20)  SpO2: 96% (07-06-22 @ 07:39) (96% - 98%)  Wt(kg): --  CAPILLARY BLOOD GLUCOSE          PHYSICAL EXAM:  General: NAD  Neuro:  Alert & oriented x 3  CV: +S1+S2 regular rate and rhythm  Lung: clear to ausculation bilaterally  Abdomen: BS+ Soft, NT, ND.   Extremities: no pedal edema or calf tenderness noted       LABS:                        12.4   9.31  )-----------( 158      ( 05 Jul 2022 07:59 )             36.6     07-05    139  |  104  |  16  ----------------------------<  95  4.1   |  28  |  0.83    Ca    8.7      05 Jul 2022 07:59  Phos  3.9     07-05  Mg     2.2     07-05    TPro  6.6  /  Alb  3.1<L>  /  TBili  0.6  /  DBili  x   /  AST  14  /  ALT  18  /  AlkPhos  59  07-05    PT/INR - ( 05 Jul 2022 07:59 )   PT: 13.3 sec;   INR: 1.15 ratio         PTT - ( 05 Jul 2022 07:59 )  PTT:33.1 sec  I&O's Detail        RADIOLOGY:

## 2022-07-06 NOTE — PROGRESS NOTE ADULT - ASSESSMENT
61 yo male here with first episode of diverticulitis.  Tolerating clear liquid diet.  Ambulating, passing flatus. Awaiting am labs.  Currently normal white count, afebrile.

## 2022-07-06 NOTE — PROGRESS NOTE ADULT - SUBJECTIVE AND OBJECTIVE BOX
ROSE CHEEMA is a 62yMale , patient examined and chart reviewed.     INTERVAL HPI/ OVERNIGHT EVENTS:   Feeling better. Tolerating diet.  No events.    PAST MEDICAL & SURGICAL HISTORY:  Lumbar herniated disc  Depressive disorder  Primary lateral sclerosis  History of tonsillectomy        For details regarding the patient's social history, family history, and other miscellaneous elements, please refer the initial infectious diseases consultation and/or the admitting history and physical examination for this admission.      ROS:  CONSTITUTIONAL:  Negative fever or chills  EYES:  Negative  blurry vision or double vision  CARDIOVASCULAR:  Negative for chest pain or palpitations  RESPIRATORY:  Negative for cough, wheezing, or SOB   GASTROINTESTINAL:  Negative for nausea, vomiting, diarrhea, constipation, or abdominal pain  GENITOURINARY:  Negative frequency, urgency or dysuria  NEUROLOGIC:  No headache, confusion, dizziness, lightheadedness  All other systems were reviewed and are negative     ALLERGIES:  bee stings (Swelling)      Current inpatient medications :    ANTIBIOTICS/RELEVANT:  piperacillin/tazobactam IVPB.. 3.375 Gram(s) IV Intermittent every 8 hours      DULoxetine 30 milliGRAM(s) Oral daily  heparin   Injectable 5000 Unit(s) SubCutaneous every 12 hours  ondansetron Injectable 4 milliGRAM(s) IV Push every 8 hours PRN      Objective:    T(C): 36.7 (22 @ 07:39), Max: 36.7 (22 @ 15:10)  HR: 59 (22 @ 07:39) (53 - 59)  BP: 123/70 (22 @ 07:39) (123/70 - 134/57)  RR: 20 (22 @ 07:39) (18 - 20)  SpO2: 96% (22 @ 07:39) (96% - 98%)      Physical Exam:  General: no acute distress  Neck: supple, trachea midline  Lungs: clear, no wheeze/rhonchi  Cardiovascular: regular rate and rhythm, S1 S2  Abdomen: soft, nontender,  bowel sounds normal  Neurological: alert and oriented x3  Skin: no rash  Extremities: no edema          LABS:                        13.3   7.07  )-----------( 187      ( 2022 06:00 )             40.4   07-    138  |  101  |  12  ----------------------------<  77  4.0   |  28  |  0.79    Ca    9.2      2022 06:00  Phos  3.9     07-05  Mg     2.2     07-05    TPro  6.6  /  Alb  3.1<L>  /  TBili  0.6  /  DBili  x   /  AST  14  /  ALT  18  /  AlkPhos  59  07-05    Urinalysis Basic - ( 2022 22:12 )    Color: Yellow / Appearance: Clear / S.025 / pH: x  Gluc: x / Ketone: Trace  / Bili: Negative / Urobili: Negative mg/dL   Blood: x / Protein: 15 mg/dL / Nitrite: Negative   Leuk Esterase: Trace / RBC: 0-2 /HPF / WBC 0-2   Sq Epi: x / Non Sq Epi: Few / Bacteria: x    MICROBIOLOGY:    Culture - Urine (collected 2022 08:46)  Source: Clean Catch Clean Catch (Midstream)  Final Report (2022 10:29):    <10,000 CFU/mL Normal Urogenital Araceli    RADIOLOGY & ADDITIONAL STUDIES:  ACC: [id number] EXAM:  CT ABDOMEN AND PELVIS IC                          PROCEDURE DATE:  2022          INTERPRETATION:  CLINICAL INFORMATION: Lower abdominal pain    COMPARISON: None.    CONTRAST/COMPLICATIONS:  IV Contrast: Omnipaque 350  90 cc administered   10 cc discarded  Oral Contrast: NONE  Complications: None reported at time of study completion    PROCEDURE:  CT of the Abdomen and Pelvis was performed.  Sagittal and coronal reformats were performed.    FINDINGS:  LOWER CHEST: Within normal limits.    LIVER: Within normal limits.  BILE DUCTS: Normal caliber.  GALLBLADDER: Within normal limits.  SPLEEN: 1.8 cm rounded hypodense lesion in the anterior spleen,   nonspecific, possibly a hemangioma, lymphangioma, or other benign   pathology.  PANCREAS: Within normal limits.  ADRENALS: Within normal limits.  KIDNEYS/URETERS: Small peripelvic renal cysts. No hydroureteronephrosis.    BLADDER: Mild anterior bladder wall thickening and adjacent fat   stranding, likely reactive.  REPRODUCTIVE ORGANS: Cystic dilatation of the seminal vesicles   bilaterally. Prostate gland is normal in size.    BOWEL: No bowel obstruction. Appendix is thick-walled, fluid-filled,   measures up to 10 mm in diameter. There is colonic diverticulosis, as   well as short segment sigmoid colon wall thickening (submucosal edema)   centered around an inflamed diverticulum with adjacent fat stranding and   trace free fluid, compatible with acute diverticulitis.  PERITONEUM: No ascites.  VESSELS: Retroaortic left renal vein.  RETROPERITONEUM/LYMPH NODES: No lymphadenopathy.  ABDOMINAL WALL: Moderate-sized fat-containing right inguinal hernia.  BONES: Degenerative changes.    IMPRESSION:  Acute diverticulitis of the sigmoid colon.    Fluid-filled, thick-walled appendix measuring up to 10 mm in diameter,   concerning for concurrent acute appendicitis.    Recommend surgical consult.    Cystic dilatation of the seminal vesicles.    Assessment :   62M with herniated discs, primary lateral sclerosis admitted with acute diverticulitis of the sigmoid colon  No concurrent acute appendicitis per surgery  Wbc normalised  Tolerating diet  Clinically better    Plan :   Cont Zosyn  Can change to po Vantin Flagyl x 10 days once cleared for dc  Trend temps and cbc  Serial abd exams  Diet per surgery/GI      D/w Dr Mota    Continue with present regiment.  Appropriate use of antibiotics and adverse effects reviewed.      I have discussed the above plan of care with patient in detail. He expressed understanding of the  treatment plan . Risks, benefits and alternatives discussed in detail. I have asked if he has any questions or concerns and appropriately addressed them to the best of my ability .    > 35 minutes were spent in direct patient care reviewing notes, medications ,labs data/ imaging , discussion with multidisciplinary team.    Thank you for allowing me to participate in care of your patient .    Lisa Donahue MD  Infectious Disease  [phone]

## 2022-07-06 NOTE — PROGRESS NOTE ADULT - PROBLEM SELECTOR PLAN 1
Continue Zosyn  Continue clear liquid diet, with possible advancement this afternoon  Continue VTE prophylaxis while in hospital  Ambulation  Awaiting Am labs  Appears to be improving with no signs of pain or discomfort and tolerating of diet with a normal white count  Discussed with Dr. Matias

## 2022-07-06 NOTE — PROGRESS NOTE ADULT - SUBJECTIVE AND OBJECTIVE BOX
Chief Complaint:        INTERVAL HPI/OVERNIGHT EVENTS:    no fever   wbc normal   no pain.    MEDICATIONS  (STANDING):  DULoxetine 30 milliGRAM(s) Oral daily  heparin   Injectable 5000 Unit(s) SubCutaneous every 12 hours  lactated ringers. 1000 milliLiter(s) (100 mL/Hr) IV Continuous <Continuous>  piperacillin/tazobactam IVPB.. 3.375 Gram(s) IV Intermittent every 8 hours    MEDICATIONS  (PRN):  ondansetron Injectable 4 milliGRAM(s) IV Push every 8 hours PRN Nausea and/or Vomiting            Vital Signs Last 24 Hrs  T(C): 36.7 (06 Jul 2022 07:39), Max: 36.7 (05 Jul 2022 15:10)  T(F): 98.1 (06 Jul 2022 07:39), Max: 98.1 (06 Jul 2022 07:39)  HR: 59 (06 Jul 2022 07:39) (53 - 59)  BP: 123/70 (06 Jul 2022 07:39) (123/70 - 134/57)  BP(mean): --  RR: 20 (06 Jul 2022 07:39) (18 - 20)  SpO2: 96% (06 Jul 2022 07:39) (96% - 98%)    Physical exam:  abd soft, non tender  cv s1s2  chest air entry          LABS:                        12.4   9.31  )-----------( 158      ( 05 Jul 2022 07:59 )             36.6     07-05    139  |  104  |  16  ----------------------------<  95  4.1   |  28  |  0.83    Ca    8.7      05 Jul 2022 07:59  Phos  3.9     07-05  Mg     2.2     07-05    TPro  6.6  /  Alb  3.1<L>  /  TBili  0.6  /  DBili  x   /  AST  14  /  ALT  18  /  AlkPhos  59  07-05    PT/INR - ( 05 Jul 2022 07:59 )   PT: 13.3 sec;   INR: 1.15 ratio         PTT - ( 05 Jul 2022 07:59 )  PTT:33.1 sec      RADIOLOGY & ADDITIONAL TESTS:

## 2022-07-06 NOTE — PROGRESS NOTE ADULT - ASSESSMENT
62M with herniated discs, primary lateral sclerosis (ambulates with a cane) who presents with abdominal pain.   Found to have diverticulitis on CT.      Diverticulitis   - appendicitis unlikely as per surgery  - surgery input appreciated   - cont with zosyn for now  - Will need 10-14 day course of po abx   - Diet advanced today, continue as tolerated   - pain control    Incidental findings   - Cystic dilatation of the seminal vesicles noted on imaging  - Outpatient PCP/urology follow up    Back pain  - cont with cymbalta  - tylenol PRN    Preventive measures  - Heparin bid

## 2022-07-07 ENCOUNTER — TRANSCRIPTION ENCOUNTER (OUTPATIENT)
Age: 62
End: 2022-07-07

## 2022-07-07 VITALS
OXYGEN SATURATION: 95 % | TEMPERATURE: 98 F | RESPIRATION RATE: 20 BRPM | DIASTOLIC BLOOD PRESSURE: 65 MMHG | HEART RATE: 53 BPM | SYSTOLIC BLOOD PRESSURE: 126 MMHG

## 2022-07-07 LAB
ANION GAP SERPL CALC-SCNC: 7 MMOL/L — SIGNIFICANT CHANGE UP (ref 5–17)
BUN SERPL-MCNC: 11 MG/DL — SIGNIFICANT CHANGE UP (ref 7–23)
CALCIUM SERPL-MCNC: 9.3 MG/DL — SIGNIFICANT CHANGE UP (ref 8.4–10.5)
CHLORIDE SERPL-SCNC: 102 MMOL/L — SIGNIFICANT CHANGE UP (ref 96–108)
CO2 SERPL-SCNC: 28 MMOL/L — SIGNIFICANT CHANGE UP (ref 22–31)
CREAT SERPL-MCNC: 0.79 MG/DL — SIGNIFICANT CHANGE UP (ref 0.5–1.3)
EGFR: 101 ML/MIN/1.73M2 — SIGNIFICANT CHANGE UP
GLUCOSE SERPL-MCNC: 99 MG/DL — SIGNIFICANT CHANGE UP (ref 70–99)
HCT VFR BLD CALC: 39.9 % — SIGNIFICANT CHANGE UP (ref 39–50)
HGB BLD-MCNC: 13.6 G/DL — SIGNIFICANT CHANGE UP (ref 13–17)
MCHC RBC-ENTMCNC: 29.6 PG — SIGNIFICANT CHANGE UP (ref 27–34)
MCHC RBC-ENTMCNC: 34.1 GM/DL — SIGNIFICANT CHANGE UP (ref 32–36)
MCV RBC AUTO: 86.9 FL — SIGNIFICANT CHANGE UP (ref 80–100)
NRBC # BLD: 0 /100 WBCS — SIGNIFICANT CHANGE UP (ref 0–0)
PLATELET # BLD AUTO: 195 K/UL — SIGNIFICANT CHANGE UP (ref 150–400)
POTASSIUM SERPL-MCNC: 4.1 MMOL/L — SIGNIFICANT CHANGE UP (ref 3.5–5.3)
POTASSIUM SERPL-SCNC: 4.1 MMOL/L — SIGNIFICANT CHANGE UP (ref 3.5–5.3)
RBC # BLD: 4.59 M/UL — SIGNIFICANT CHANGE UP (ref 4.2–5.8)
RBC # FLD: 11.8 % — SIGNIFICANT CHANGE UP (ref 10.3–14.5)
SODIUM SERPL-SCNC: 137 MMOL/L — SIGNIFICANT CHANGE UP (ref 135–145)
WBC # BLD: 6.38 K/UL — SIGNIFICANT CHANGE UP (ref 3.8–10.5)
WBC # FLD AUTO: 6.38 K/UL — SIGNIFICANT CHANGE UP (ref 3.8–10.5)

## 2022-07-07 PROCEDURE — 99285 EMERGENCY DEPT VISIT HI MDM: CPT | Mod: 25

## 2022-07-07 PROCEDURE — 87635 SARS-COV-2 COVID-19 AMP PRB: CPT

## 2022-07-07 PROCEDURE — 81001 URINALYSIS AUTO W/SCOPE: CPT

## 2022-07-07 PROCEDURE — 83735 ASSAY OF MAGNESIUM: CPT

## 2022-07-07 PROCEDURE — 85025 COMPLETE CBC W/AUTO DIFF WBC: CPT

## 2022-07-07 PROCEDURE — 87086 URINE CULTURE/COLONY COUNT: CPT

## 2022-07-07 PROCEDURE — 99232 SBSQ HOSP IP/OBS MODERATE 35: CPT | Mod: FS

## 2022-07-07 PROCEDURE — 96374 THER/PROPH/DIAG INJ IV PUSH: CPT

## 2022-07-07 PROCEDURE — 36415 COLL VENOUS BLD VENIPUNCTURE: CPT

## 2022-07-07 PROCEDURE — 86900 BLOOD TYPING SEROLOGIC ABO: CPT

## 2022-07-07 PROCEDURE — 86850 RBC ANTIBODY SCREEN: CPT

## 2022-07-07 PROCEDURE — 74177 CT ABD & PELVIS W/CONTRAST: CPT | Mod: MA

## 2022-07-07 PROCEDURE — 99239 HOSP IP/OBS DSCHRG MGMT >30: CPT

## 2022-07-07 PROCEDURE — 83690 ASSAY OF LIPASE: CPT

## 2022-07-07 PROCEDURE — 84100 ASSAY OF PHOSPHORUS: CPT

## 2022-07-07 PROCEDURE — 85610 PROTHROMBIN TIME: CPT

## 2022-07-07 PROCEDURE — 80053 COMPREHEN METABOLIC PANEL: CPT

## 2022-07-07 PROCEDURE — 86803 HEPATITIS C AB TEST: CPT

## 2022-07-07 PROCEDURE — 85027 COMPLETE CBC AUTOMATED: CPT

## 2022-07-07 PROCEDURE — 80048 BASIC METABOLIC PNL TOTAL CA: CPT

## 2022-07-07 PROCEDURE — 86901 BLOOD TYPING SEROLOGIC RH(D): CPT

## 2022-07-07 PROCEDURE — 85730 THROMBOPLASTIN TIME PARTIAL: CPT

## 2022-07-07 RX ORDER — CEFPODOXIME PROXETIL 100 MG
1 TABLET ORAL
Qty: 20 | Refills: 0
Start: 2022-07-07 | End: 2022-07-16

## 2022-07-07 RX ORDER — MORPHINE SULFATE 50 MG/1
2 CAPSULE, EXTENDED RELEASE ORAL ONCE
Refills: 0 | Status: DISCONTINUED | OUTPATIENT
Start: 2022-07-07 | End: 2022-07-07

## 2022-07-07 RX ORDER — METRONIDAZOLE 500 MG
1 TABLET ORAL
Qty: 30 | Refills: 0
Start: 2022-07-07 | End: 2022-07-16

## 2022-07-07 RX ADMIN — PIPERACILLIN AND TAZOBACTAM 25 GRAM(S): 4; .5 INJECTION, POWDER, LYOPHILIZED, FOR SOLUTION INTRAVENOUS at 10:12

## 2022-07-07 RX ADMIN — PIPERACILLIN AND TAZOBACTAM 25 GRAM(S): 4; .5 INJECTION, POWDER, LYOPHILIZED, FOR SOLUTION INTRAVENOUS at 01:40

## 2022-07-07 RX ADMIN — MORPHINE SULFATE 2 MILLIGRAM(S): 50 CAPSULE, EXTENDED RELEASE ORAL at 06:49

## 2022-07-07 RX ADMIN — MORPHINE SULFATE 2 MILLIGRAM(S): 50 CAPSULE, EXTENDED RELEASE ORAL at 06:43

## 2022-07-07 NOTE — PROGRESS NOTE ADULT - PROVIDER SPECIALTY LIST ADULT
Surgery
Gastroenterology
Gastroenterology
Infectious Disease
Infectious Disease
Surgery
Surgery
Hospitalist

## 2022-07-07 NOTE — DISCHARGE NOTE NURSING/CASE MANAGEMENT/SOCIAL WORK - PATIENT PORTAL LINK FT
You can access the FollowMyHealth Patient Portal offered by [facility] by registering at the following website: [email or web address]. By joining [facility]’s FollowMyHealth portal, you will also be able to view your health information using other applications (apps) compatible with our system.

## 2022-07-07 NOTE — DISCHARGE NOTE PROVIDER - HOSPITAL COURSE
FROM ADMISSION H+P:   HPI:  62M with herniated discs, primary lateral sclerosis (ambulates with a cane) who presents with abdominal pain.  Pain started on Thursday evening after he and his family went to dinner at [employer].  Reports that his wife was also feeling unwell with diarrhea and abdominal pain (all shared the same food).  Patient tried milk of magnesia thinking it was constipation but only had worsening abdominal pain.  Tried a stool softener but only had water and gas.  Yesterday patient said he felt slightly better but then the symptoms returned today.  Pain is described as an intermittent cramping/stabbing, rated as a 8-9/10, pain located in the suprapubic area with radiation to the flanks.  No other radiation.  No fevers.  No nausea/vomiting.  Patient said that he would have flatus which would improve his pain.  Has not had a solid BM but said he normally does not BMs daily.  Patient also reports that he feels like he has been urinating more often.  No other symptoms.  Denies any chest pain, SOB, light-headedness, dizziness.  In the ED, patient's BP was 121/75, HR 77, RR 18, 99% on RA, T 98.5F.  Labs showed a barely elevated WBC at 10.56, very slight anemia at 12.2,  CT A/P showed "acute diverticulitis of the sigmoid colon, fluid-filled, thick-walled appendix measuring up to 10mm in diameter, concerning for concurrent acute appendicitis."  Surgery was consulted and thought acute appendicitis was unlikely given timing and lack of periappendiceal stranding on CT.  Also felt that IV antibiotic was sufficient for uncomplicated acute diverticulitis.  Therefore patient is being admitted to medicine for IV antibiotics.  Patient currently on zosyn for diverticulitis and denies any acute abdominal pain (only has some soreness when palpated).         (04 Jul 2022 23:54)      ---  HOSPITAL COURSE:     Diverticulitis   - appendicitis unlikely as per surgery  - surgery input appreciated   - 10 day course of vantin Flagyl on discharge   - Tolerating diet     Incidental findings   - Cystic dilatation of the seminal vesicles noted on imaging  - Outpatient PCP/urology follow up. Explained to patient in detail and provided report of imaging on discharge    Back pain  - cont with cymbalta  - tylenol PRN  ---  TIME SPENT:  I, the attending physician, was physically present for the key portions of the evaluation and management (E/M) service provided. The total amount of time spent reviewing the hospital notes, laboratory values, imaging findings, assessing/counseling the patient, discussing with consultant physicians, social work, nursing staff was 36 minutes

## 2022-07-07 NOTE — PROGRESS NOTE ADULT - SUBJECTIVE AND OBJECTIVE BOX
SURGERY PA NOTE    61 y/o WM with PMHx of depression admitted with uncomplicated diverticulitis    SUBJECTIVE:  Patient seen at beside, no overnight events, no complaints at this time.  Reports pain is well-controlled and improved since yesterday.  Patient admits to tolerating low-fiber diet, flatus, bowel movement, voiding independently, ambulating.  Patient denies chest pain, shortness of breath, headache, dizziness, fever/chills, dysuria, nausea, vomiting, diarrhea.    OBJECTIVE:   T(F): 98.1 (07-07-22 @ 07:27), Max: 98.3 (07-06-22 @ 23:48)  HR: 53 (07-07-22 @ 07:27) (53 - 68)  BP: 126/65 (07-07-22 @ 07:27) (123/69 - 126/65)  RR: 20 (07-07-22 @ 07:27) (18 - 20)  SpO2: 95% (07-07-22 @ 07:27) (95% - 98%)  CAPILLARY BLOOD GLUCOSE      PHYSICAL EXAM:  General: A+O x 3, NAD  HEENT: PERRLA, EOMs intact, non-icteric  Chest: Clear to auscultation bilaterally, no rales/rhonchi/wheezes noted  Heart: S1, S2 clear, RRR  Abdomen: soft, non-distended, mild LLQ tenderness without guarding, BS x 4, no rebound, no CVA noted  Extremities: nonedematous bilaterally, warm, no calf tenderness noted     MEDICATIONS  (STANDING):  DULoxetine 30 milliGRAM(s) Oral daily  heparin   Injectable 5000 Unit(s) SubCutaneous every 12 hours  piperacillin/tazobactam IVPB.. 3.375 Gram(s) IV Intermittent every 8 hours    MEDICATIONS  (PRN):      LABS:                        13.6   6.38  )-----------( 195      ( 07 Jul 2022 08:51 )             39.9     07-06    138  |  101  |  12  ----------------------------<  77  4.0   |  28  |  0.79    Ca    9.2      06 Jul 2022 06:00

## 2022-07-07 NOTE — DISCHARGE NOTE PROVIDER - CARE PROVIDER_API CALL
PCP,   Phone: (   )    -  Fax: (   )    -  Follow Up Time: 1-3 days    Rob Thompson)  Gastroenterology; Internal Medicine  [address], [address]  [place] [address]  Phone: [phone]  Fax: [phone]  Follow Up Time: 2 weeks    Rob Agustin)  Urology  [address], [address]  [place] [address]  Phone: [phone]  Fax: [phone]  Follow Up Time:     Jorge Mcallister  UROLOGY  [address] [place] [address]  Phone: [phone]  Fax: [phone]  Follow Up Time:    Rob Thompson)  Gastroenterology; Internal Medicine  [address] [facility], [address]  [place] [address]  Phone: [phone]  Fax: [phone]  Follow Up Time: 2 weeks    Rob Agustin)  Urology  [address] [facility], [address]  [place] [address]  Phone: [phone]  Fax: [phone]  Follow Up Time:     Jorge Mcallister  UROLOGY  [address] [place] [address]  Phone: [phone]  Fax: [phone]  Follow Up Time:     PCP,   Phone: (   )    -  Fax: (   )    -  Follow Up Time: 1-3 days    Dorian Matias)  Surgery  [address] [place] [address]  Phone: [phone]  Fax: [phone]  Follow Up Time: 1 week

## 2022-07-07 NOTE — PROGRESS NOTE ADULT - NS ATTEND AMEND GEN_ALL_CORE FT
Patient seen and examined.  Physical exam grossly unremarkable.  Left lower quadrant and suprapubic pain has near completely resolved.  The patient has tolerated a low fiber diet without difficulty and has regular bowel function.  Blood work continues to improve.    Patient is cleared from a surgical perspective for discharge home on oral antibiotics   And continue low residue diet    Follow-up with me in 1 to 2 weeks to discuss ongoing and long-term management of the sigmoid diverticulitis,   Including potential role of surgical intervention.    The patient is encouraged to follow-up with her gastroenterologist.  He inquires if he would be okay to follow-up with Dr. Thompson.    Appropriate contact information should be provided in his discharge instructions.
Patient seen and examined.  He has been out of bed and ambulating.  Reports near complete resolution of left lower quadrant and suprapubic symptoms.  He reports passing flatus as well as having bowel function.  He has tolerated clears and subsequently full liquid diet and is scheduled to advance to a low residue diet today.  Labs and vitals remained stable.  Continues on IV antibiotics but will be transitioned to oral antibiotics upon discharge.  Given the improvements in the patient's clinical picture there is no contraindications to discharge home.  No acute surgical intervention is indicated at this time however the patient is encouraged to follow-up with me in 2 to 3 weeks   To discuss further work-up and management of the diverticulitis, arrange for colonoscopy to follow-up as well  with his gastroenterologist.   pending those findings and discussions potential elective sigmoid colectomy can be offered.

## 2022-07-07 NOTE — DISCHARGE NOTE PROVIDER - NSDCFUADDINST_GEN_ALL_CORE_FT
- Please make an appointment for follow up with your primary doctor within 1-3 days of discharge  - It is important to see your primary physician as well as the physicians listed below to perform a comprehensive medical review.  Call their offices for an appointment as soon as you leave the hospital.  You will also need to see them for renewal of your medications.  If you do not have a primary physician, contact the [facility] Physician Referral Service at [phone].  To obtain your results, you can access the [facility] Patient Portal at [email or web address].   - Your medical issues appear to be stable at this time, but if your symptoms recur or worsen, contact your physicians and/or return to the hospital if necessary.    -If you encounter any issues or questions with your medication, call your physicians before stopping the medication.    - Limit your diet to 2 grams of sodium daily.  - Patient or caretaker is responsible for making all appointments

## 2022-07-07 NOTE — DISCHARGE NOTE NURSING/CASE MANAGEMENT/SOCIAL WORK - NSDCPEFALRISK_GEN_ALL_CORE
For information on Fall & Injury Prevention, visit: [email or web address] OR  [email or web address] OR  https://www.cdc.gov/steadi/patient.html Breast implant

## 2022-07-07 NOTE — PROGRESS NOTE ADULT - REASON FOR ADMISSION
abdominal pain -> diverticulitis
Acute Sigmoid diverticulitis
uncomplicated diverticulitis
abdominal pain -> diverticulitis

## 2022-07-07 NOTE — DISCHARGE NOTE PROVIDER - NSDCCPCAREPLAN_GEN_ALL_CORE_FT
PRINCIPAL DISCHARGE DIAGNOSIS  Diagnosis: Acute diverticulitis  Assessment and Plan of Treatment: Evaluated by GI, infectious diseases and surgery while admitted. Please continue Vantin and Flagyl (antibiotics) for 10 more days. Follow up with PCP within 1 week of discharge. Follow up with GI for colonoscopy within 6-8 weeks      SECONDARY DISCHARGE DIAGNOSES  Diagnosis: Cyst of seminal vesicle  Assessment and Plan of Treatment: Incidental finding on imaging. Please follow up with PCP or urology for further management

## 2022-07-07 NOTE — DISCHARGE NOTE PROVIDER - PROVIDER TOKENS
FREE:[LAST:[PCP],PHONE:[(   )    -],FAX:[(   )    -],FOLLOWUP:[1-3 days]],PROVIDER:[TOKEN:[[id number]],FOLLOWUP:[2 weeks]],PROVIDER:[TOKEN:[[id number]]],PROVIDER:[TOKEN:[[id number]]] PROVIDER:[TOKEN:[[id number]],FOLLOWUP:[2 weeks]],PROVIDER:[TOKEN:[[id number]]],PROVIDER:[TOKEN:[[id number]]],FREE:[LAST:[PCP],PHONE:[(   )    -],FAX:[(   )    -],FOLLOWUP:[1-3 days]],PROVIDER:[TOKEN:[[id number]],FOLLOWUP:[1 week]]

## 2022-07-07 NOTE — PROGRESS NOTE ADULT - ASSESSMENT
A/P: 61 y/o WM with acute uncomplicated diverticulitis, progressing well,    Continue current care  Diet - low fiber  GI/DVT prophylaxis  Analgesia prn  OOB/ambulation on the floor  Incentive spirometry/Cough/Deep breathing exercises  Surgically stable for d/c planning with PO abx Vantin/Flagyl x 10 days and GI and Surgery f/u as OP    Case & plan discussed with Dr. VANCE Matias and patient's nurse.

## 2022-07-07 NOTE — DISCHARGE NOTE PROVIDER - NPI NUMBER (FOR SYSADMIN USE ONLY) :
[UNKNOWN],[[id number]],[[id number]],[[id number]] [[id number]],[[id number]],[[id number]],[UNKNOWN],[[id number]]

## 2022-07-07 NOTE — DISCHARGE NOTE PROVIDER - NSDCMRMEDTOKEN_GEN_ALL_CORE_FT
cefpodoxime 200 mg oral tablet: 1 tab(s) orally 2 times a day   Cymbalta 20 mg oral delayed release capsule: orally once a day  Flexeril 5 mg oral tablet:   metroNIDAZOLE 500 mg oral tablet: 1 tab(s) orally 3 times a day

## 2022-07-07 NOTE — PROGRESS NOTE ADULT - SUBJECTIVE AND OBJECTIVE BOX
Patient is a 62y old  Male who presents with a chief complaint of abdominal pain -> diverticulitis (06 Jul 2022 14:12)      INTERVAL HPI/OVERNIGHT EVENTS: Patient seen and examined at bedside. No overnight events. Feeling better today. Tolerating diet.    MEDICATIONS  (STANDING):  DULoxetine 30 milliGRAM(s) Oral daily  heparin   Injectable 5000 Unit(s) SubCutaneous every 12 hours  piperacillin/tazobactam IVPB.. 3.375 Gram(s) IV Intermittent every 8 hours    MEDICATIONS  (PRN):  ondansetron Injectable 4 milliGRAM(s) IV Push every 8 hours PRN Nausea and/or Vomiting      Allergies    bee stings (Swelling)  No Known Drug Allergies    Intolerances        REVIEW OF SYSTEMS:  CONSTITUTIONAL: No fever or chills  HEENT:  No headache, no sore throat  RESPIRATORY: No cough, wheezing, or shortness of breath  CARDIOVASCULAR: No chest pain, palpitations, or leg swelling  GASTROINTESTINAL: No abd pain, nausea, vomiting, or diarrhea  GENITOURINARY: No dysuria, frequency, or hematuria  NEUROLOGICAL: no focal weakness or dizziness  MUSCULOSKELETAL: no myalgias     Vital Signs Last 24 Hrs  T(C): 36.7 (07 Jul 2022 07:27), Max: 36.8 (06 Jul 2022 23:48)  T(F): 98.1 (07 Jul 2022 07:27), Max: 98.3 (06 Jul 2022 23:48)  HR: 53 (07 Jul 2022 07:27) (53 - 68)  BP: 126/65 (07 Jul 2022 07:27) (123/69 - 126/65)  BP(mean): --  RR: 20 (07 Jul 2022 07:27) (18 - 20)  SpO2: 95% (07 Jul 2022 07:27) (95% - 98%)    PHYSICAL EXAM:  GENERAL: NAD  HEENT:  NCAT, moist mucous membranes  CHEST/LUNG:  CTA b/l, no rales, wheezes, or rhonchi  HEART:  RRR, S1, S2  ABDOMEN:  BS+, soft, nontender, nondistended  EXTREMITIES: no edema, cyanosis, or calf tenderness  NERVOUS SYSTEM: AA&Ox3, sensation intact    LABS:    CBC Full  -  ( 06 Jul 2022 06:00 )  WBC Count : 7.07 K/uL  Hemoglobin : 13.3 g/dL  Hematocrit : 40.4 %  Platelet Count - Automated : 187 K/uL  Mean Cell Volume : 88.8 fl  Mean Cell Hemoglobin : 29.2 pg  Mean Cell Hemoglobin Concentration : 32.9 gm/dL  Auto Neutrophil # : x  Auto Lymphocyte # : x  Auto Monocyte # : x  Auto Eosinophil # : x  Auto Basophil # : x  Auto Neutrophil % : x  Auto Lymphocyte % : x  Auto Monocyte % : x  Auto Eosinophil % : x  Auto Basophil % : x      Ca    9.2        06 Jul 2022 06:00          CAPILLARY BLOOD GLUCOSE            Culture - Urine (collected 07-05-22 @ 08:46)  Source: Clean Catch Clean Catch (Midstream)  Final Report (07-06-22 @ 10:29):    <10,000 CFU/mL Normal Urogenital Araceli        RADIOLOGY & ADDITIONAL TESTS: < from: CT Abdomen and Pelvis w/ IV Cont (07.04.22 @ 22:00) >    ACC: [id number] EXAM:  CT ABDOMEN AND PELVIS IC                          PROCEDURE DATE:  07/04/2022          INTERPRETATION:  CLINICAL INFORMATION: Lower abdominal pain    COMPARISON: None.    CONTRAST/COMPLICATIONS:  IV Contrast: Omnipaque 350  90 cc administered   10 cc discarded  Oral Contrast: NONE  Complications: None reported at time of study completion    PROCEDURE:  CT of the Abdomen and Pelvis was performed.  Sagittal and coronal reformats were performed.    FINDINGS:  LOWER CHEST: Within normal limits.    LIVER: Within normal limits.  BILE DUCTS: Normal caliber.  GALLBLADDER: Within normal limits.  SPLEEN: 1.8 cm rounded hypodense lesion in the anterior spleen,   nonspecific, possibly a hemangioma, lymphangioma, or other benign   pathology.  PANCREAS: Within normal limits.  ADRENALS: Within normal limits.  KIDNEYS/URETERS: Small peripelvic renal cysts. No hydroureteronephrosis.    BLADDER: Mild anterior bladder wall thickening and adjacent fat   stranding, likely reactive.  REPRODUCTIVE ORGANS: Cystic dilatation of the seminal vesicles   bilaterally. Prostate gland is normal in size.    BOWEL: No bowel obstruction. Appendix is thick-walled, fluid-filled,   measures up to 10 mm in diameter. There is colonic diverticulosis, as   well as short segment sigmoid colon wall thickening (submucosal edema)   centered around an inflamed diverticulum with adjacent fat stranding and   trace free fluid, compatible with acute diverticulitis.  PERITONEUM: No ascites.  VESSELS: Retroaortic left renal vein.  RETROPERITONEUM/LYMPH NODES: No lymphadenopathy.  ABDOMINAL WALL: Moderate-sized fat-containing right inguinal hernia.  BONES: Degenerative changes.    IMPRESSION:  Acute diverticulitis of the sigmoid colon.    Fluid-filled, thick-walled appendix measuring up to 10 mm in diameter,   concerning for concurrent acute appendicitis.    Recommend surgical consult.    Cystic dilatation of the seminal vesicles.    --- End of Report ---            SASHA TAYLOR MD; Attending Radiologist  This document has been electronically signed. Jul 4 2022 10:44PM    < end of copied text >      Consultant(s) Notes Reviewed:  [x] YES  [ ] NO    Care Discussed with [x] Consultants  [x] Patient  [ ] Family  [ ]      [ x] Other; RN  DVT ppx   Patient is a 62y old  Male who presents with a chief complaint of abdominal pain -> diverticulitis (06 Jul 2022 14:12)      INTERVAL HPI/OVERNIGHT EVENTS: Patient seen and examined at bedside. No overnight events. Feeling better today. Tolerating diet. Received Morphine x1 this morning for arm pain/back pain    MEDICATIONS  (STANDING):  DULoxetine 30 milliGRAM(s) Oral daily  heparin   Injectable 5000 Unit(s) SubCutaneous every 12 hours  piperacillin/tazobactam IVPB.. 3.375 Gram(s) IV Intermittent every 8 hours    MEDICATIONS  (PRN):  ondansetron Injectable 4 milliGRAM(s) IV Push every 8 hours PRN Nausea and/or Vomiting      Allergies    bee stings (Swelling)  No Known Drug Allergies    Intolerances        REVIEW OF SYSTEMS:  CONSTITUTIONAL: No fever or chills  HEENT:  No headache, no sore throat  RESPIRATORY: No cough, wheezing, or shortness of breath  CARDIOVASCULAR: No chest pain, palpitations, or leg swelling  GASTROINTESTINAL: No abd pain, nausea, vomiting, or diarrhea  GENITOURINARY: No dysuria, frequency, or hematuria  NEUROLOGICAL: no focal weakness or dizziness  MUSCULOSKELETAL: no myalgias     Vital Signs Last 24 Hrs  T(C): 36.7 (07 Jul 2022 07:27), Max: 36.8 (06 Jul 2022 23:48)  T(F): 98.1 (07 Jul 2022 07:27), Max: 98.3 (06 Jul 2022 23:48)  HR: 53 (07 Jul 2022 07:27) (53 - 68)  BP: 126/65 (07 Jul 2022 07:27) (123/69 - 126/65)  BP(mean): --  RR: 20 (07 Jul 2022 07:27) (18 - 20)  SpO2: 95% (07 Jul 2022 07:27) (95% - 98%)    PHYSICAL EXAM:  GENERAL: NAD  HEENT:  NCAT, moist mucous membranes  CHEST/LUNG:  CTA b/l, no rales, wheezes, or rhonchi  HEART:  RRR, S1, S2  ABDOMEN:  BS+, soft, nontender, nondistended  EXTREMITIES: no edema, cyanosis, or calf tenderness  NERVOUS SYSTEM: AA&Ox3, sensation intact    LABS:    CBC Full  -  ( 06 Jul 2022 06:00 )  WBC Count : 7.07 K/uL  Hemoglobin : 13.3 g/dL  Hematocrit : 40.4 %  Platelet Count - Automated : 187 K/uL  Mean Cell Volume : 88.8 fl  Mean Cell Hemoglobin : 29.2 pg  Mean Cell Hemoglobin Concentration : 32.9 gm/dL  Auto Neutrophil # : x  Auto Lymphocyte # : x  Auto Monocyte # : x  Auto Eosinophil # : x  Auto Basophil # : x  Auto Neutrophil % : x  Auto Lymphocyte % : x  Auto Monocyte % : x  Auto Eosinophil % : x  Auto Basophil % : x      Ca    9.2        06 Jul 2022 06:00          CAPILLARY BLOOD GLUCOSE            Culture - Urine (collected 07-05-22 @ 08:46)  Source: Clean Catch Clean Catch (Midstream)  Final Report (07-06-22 @ 10:29):    <10,000 CFU/mL Normal Urogenital Araceli        RADIOLOGY & ADDITIONAL TESTS: < from: CT Abdomen and Pelvis w/ IV Cont (07.04.22 @ 22:00) >    ACC: [id number] EXAM:  CT ABDOMEN AND PELVIS IC                          PROCEDURE DATE:  07/04/2022          INTERPRETATION:  CLINICAL INFORMATION: Lower abdominal pain    COMPARISON: None.    CONTRAST/COMPLICATIONS:  IV Contrast: Omnipaque 350  90 cc administered   10 cc discarded  Oral Contrast: NONE  Complications: None reported at time of study completion    PROCEDURE:  CT of the Abdomen and Pelvis was performed.  Sagittal and coronal reformats were performed.    FINDINGS:  LOWER CHEST: Within normal limits.    LIVER: Within normal limits.  BILE DUCTS: Normal caliber.  GALLBLADDER: Within normal limits.  SPLEEN: 1.8 cm rounded hypodense lesion in the anterior spleen,   nonspecific, possibly a hemangioma, lymphangioma, or other benign   pathology.  PANCREAS: Within normal limits.  ADRENALS: Within normal limits.  KIDNEYS/URETERS: Small peripelvic renal cysts. No hydroureteronephrosis.    BLADDER: Mild anterior bladder wall thickening and adjacent fat   stranding, likely reactive.  REPRODUCTIVE ORGANS: Cystic dilatation of the seminal vesicles   bilaterally. Prostate gland is normal in size.    BOWEL: No bowel obstruction. Appendix is thick-walled, fluid-filled,   measures up to 10 mm in diameter. There is colonic diverticulosis, as   well as short segment sigmoid colon wall thickening (submucosal edema)   centered around an inflamed diverticulum with adjacent fat stranding and   trace free fluid, compatible with acute diverticulitis.  PERITONEUM: No ascites.  VESSELS: Retroaortic left renal vein.  RETROPERITONEUM/LYMPH NODES: No lymphadenopathy.  ABDOMINAL WALL: Moderate-sized fat-containing right inguinal hernia.  BONES: Degenerative changes.    IMPRESSION:  Acute diverticulitis of the sigmoid colon.    Fluid-filled, thick-walled appendix measuring up to 10 mm in diameter,   concerning for concurrent acute appendicitis.    Recommend surgical consult.    Cystic dilatation of the seminal vesicles.    --- End of Report ---            SASHA TAYLOR MD; Attending Radiologist  This document has been electronically signed. Jul 4 2022 10:44PM    < end of copied text >      Consultant(s) Notes Reviewed:  [x] YES  [ ] NO    Care Discussed with [x] Consultants  [x] Patient  [ ] Family  [ ]      [ x] Other; RN  DVT ppx

## 2022-07-07 NOTE — DISCHARGE NOTE PROVIDER - CARE PROVIDERS DIRECT ADDRESSES
,DirectAddress_Unknown,DirectAddress_Unknown,[email or web address] ,[email or web address],[email or web address],[email or web address],[email or web address]

## 2022-07-07 NOTE — PROGRESS NOTE ADULT - SUBJECTIVE AND OBJECTIVE BOX
ROSE CHEEMA is a 62yMale , patient examined and chart reviewed.     INTERVAL HPI/ OVERNIGHT EVENTS:   Feels well. Afebrile.  No events.    PAST MEDICAL & SURGICAL HISTORY:  Lumbar herniated disc  Depressive disorder  Primary lateral sclerosis  History of tonsillectomy        For details regarding the patient's social history, family history, and other miscellaneous elements, please refer the initial infectious diseases consultation and/or the admitting history and physical examination for this admission.      ROS:  CONSTITUTIONAL:  Negative fever or chills  EYES:  Negative  blurry vision or double vision  CARDIOVASCULAR:  Negative for chest pain or palpitations  RESPIRATORY:  Negative for cough, wheezing, or SOB   GASTROINTESTINAL:  Negative for nausea, vomiting, diarrhea, constipation, or abdominal pain  GENITOURINARY:  Negative frequency, urgency or dysuria  NEUROLOGIC:  No headache, confusion, dizziness, lightheadedness  All other systems were reviewed and are negative     ALLERGIES:  bee stings (Swelling)      Current inpatient medications :    ANTIBIOTICS/RELEVANT:  piperacillin/tazobactam IVPB.. 3.375 Gram(s) IV Intermittent every 8 hours    MEDICATIONS  (STANDING):  DULoxetine 30 milliGRAM(s) Oral daily  heparin   Injectable 5000 Unit(s) SubCutaneous every 12 hours    MEDICATIONS  (PRN):      Objective:  Vital Signs Last 24 Hrs  T(C): 36.7 (2022 07:27), Max: 36.8 (2022 23:48)  T(F): 98.1 (2022 07:27), Max: 98.3 (2022 23:48)  HR: 53 (2022 07:27) (53 - 68)  BP: 126/65 (2022 07:27) (123/69 - 126/65)  RR: 20 (2022 07:27) (18 - 20)  SpO2: 95% (2022 07:27) (95% - 98%)      Physical Exam:  General: no acute distress  Neck: supple, trachea midline  Lungs: clear, no wheeze/rhonchi  Cardiovascular: regular rate and rhythm, S1 S2  Abdomen: soft, nontender,  bowel sounds normal  Neurological: alert and oriented x3  Skin: no rash  Extremities: no edema          LABS:                        13.6   6.38  )-----------( 195      ( 2022 08:51 )             39.9   07-    137  |  102  |  11  ----------------------------<  99  4.1   |  28  |  0.79    Ca    9.3      2022 08:51    Urinalysis Basic - ( 2022 22:12 )    Color: Yellow / Appearance: Clear / S.025 / pH: x  Gluc: x / Ketone: Trace  / Bili: Negative / Urobili: Negative mg/dL   Blood: x / Protein: 15 mg/dL / Nitrite: Negative   Leuk Esterase: Trace / RBC: 0-2 /HPF / WBC 0-2   Sq Epi: x / Non Sq Epi: Few / Bacteria: x    MICROBIOLOGY:    Culture - Urine (collected 2022 08:46)  Source: Clean Catch Clean Catch (Midstream)  Final Report (2022 10:29):    <10,000 CFU/mL Normal Urogenital Araceli    RADIOLOGY & ADDITIONAL STUDIES:  ACC: [id number] EXAM:  CT ABDOMEN AND PELVIS IC                          PROCEDURE DATE:  2022          INTERPRETATION:  CLINICAL INFORMATION: Lower abdominal pain    COMPARISON: None.    CONTRAST/COMPLICATIONS:  IV Contrast: Omnipaque 350  90 cc administered   10 cc discarded  Oral Contrast: NONE  Complications: None reported at time of study completion    PROCEDURE:  CT of the Abdomen and Pelvis was performed.  Sagittal and coronal reformats were performed.    FINDINGS:  LOWER CHEST: Within normal limits.    LIVER: Within normal limits.  BILE DUCTS: Normal caliber.  GALLBLADDER: Within normal limits.  SPLEEN: 1.8 cm rounded hypodense lesion in the anterior spleen,   nonspecific, possibly a hemangioma, lymphangioma, or other benign   pathology.  PANCREAS: Within normal limits.  ADRENALS: Within normal limits.  KIDNEYS/URETERS: Small peripelvic renal cysts. No hydroureteronephrosis.    BLADDER: Mild anterior bladder wall thickening and adjacent fat   stranding, likely reactive.  REPRODUCTIVE ORGANS: Cystic dilatation of the seminal vesicles   bilaterally. Prostate gland is normal in size.    BOWEL: No bowel obstruction. Appendix is thick-walled, fluid-filled,   measures up to 10 mm in diameter. There is colonic diverticulosis, as   well as short segment sigmoid colon wall thickening (submucosal edema)   centered around an inflamed diverticulum with adjacent fat stranding and   trace free fluid, compatible with acute diverticulitis.  PERITONEUM: No ascites.  VESSELS: Retroaortic left renal vein.  RETROPERITONEUM/LYMPH NODES: No lymphadenopathy.  ABDOMINAL WALL: Moderate-sized fat-containing right inguinal hernia.  BONES: Degenerative changes.    IMPRESSION:  Acute diverticulitis of the sigmoid colon.    Fluid-filled, thick-walled appendix measuring up to 10 mm in diameter,   concerning for concurrent acute appendicitis.    Recommend surgical consult.    Cystic dilatation of the seminal vesicles.    Assessment :   62M with herniated discs, primary lateral sclerosis admitted with acute diverticulitis of the sigmoid colon  No concurrent acute appendicitis per surgery  Wbc normalised  Tolerating diet  Clinically better    Plan :   On Zosyn  Can change to po Vantin Flagyl x 10 days for dc  Trend temps and cbc  FU surgery/GI outpt  Stable from ID standpoint    D/w Dr Mota    Continue with present regiment.  Appropriate use of antibiotics and adverse effects reviewed.      I have discussed the above plan of care with patient in detail. He expressed understanding of the  treatment plan . Risks, benefits and alternatives discussed in detail. I have asked if he has any questions or concerns and appropriately addressed them to the best of my ability .    > 35 minutes were spent in direct patient care reviewing notes, medications ,labs data/ imaging , discussion with multidisciplinary team.    Thank you for allowing me to participate in care of your patient .    Lisa Donahue MD  Infectious Disease  [phone]

## 2022-07-07 NOTE — PROGRESS NOTE ADULT - SUBJECTIVE AND OBJECTIVE BOX
patient feels well no nausea vomiting no abdominal pain passign gas      MEDICATIONS  (STANDING):  DULoxetine 30 milliGRAM(s) Oral daily  heparin   Injectable 5000 Unit(s) SubCutaneous every 12 hours  lactated ringers. 1000 milliLiter(s) (100 mL/Hr) IV Continuous <Continuous>  piperacillin/tazobactam IVPB.. 3.375 Gram(s) IV Intermittent every 8 hours    MEDICATIONS  (PRN):  ondansetron Injectable 4 milliGRAM(s) IV Push every 8 hours PRN Nausea and/or Vomiting  OBJECTIVE:   T(F): 98.1 (07-07-22 @ 07:27), Max: 98.3 (07-06-22 @ 23:48)  HR: 53 (07-07-22 @ 07:27) (53 - 68)  BP: 126/65 (07-07-22 @ 07:27) (123/69 - 126/65)  RR: 20 (07-07-22 @ 07:27) (18 - 20)  SpO2: 95% (07-07-22 @ 07:27) (95% - 98%)  CAPILLARY BLOOD GLUCOSE      Physical Exam:  General: no acute distress  Neck: supple, trachea midline  Lungs: clear, no wheeze/rhonchi  Cardiovascular: regular rate and rhythm, S1 S2  Abdomen: soft, nontender,  bowel sounds normal  Neurological: alert and oriented x3  Skin: no rash  Extremities: no edema          Assessment and Plan:   · Assessment	  acute uncomplicated diverticulitis   low residue diet for 7 days  cipro/flagyl for 10 days  probiotic  needs outpt followup with his primary GI re colonoscopy in the next 1-2 months   appendix findings per surgery  OOB/chair   discharge planning

## 2023-09-12 ENCOUNTER — APPOINTMENT (OUTPATIENT)
Dept: INTERNAL MEDICINE | Facility: CLINIC | Age: 63
End: 2023-09-12
Payer: MEDICARE

## 2023-09-12 VITALS
DIASTOLIC BLOOD PRESSURE: 81 MMHG | BODY MASS INDEX: 26.95 KG/M2 | OXYGEN SATURATION: 97 % | HEART RATE: 70 BPM | RESPIRATION RATE: 12 BRPM | HEIGHT: 72 IN | WEIGHT: 199 LBS | SYSTOLIC BLOOD PRESSURE: 134 MMHG

## 2023-09-12 DIAGNOSIS — Z86.59 PERSONAL HISTORY OF OTHER MENTAL AND BEHAVIORAL DISORDERS: ICD-10-CM

## 2023-09-12 DIAGNOSIS — Z23 ENCOUNTER FOR IMMUNIZATION: ICD-10-CM

## 2023-09-12 DIAGNOSIS — N52.9 MALE ERECTILE DYSFUNCTION, UNSPECIFIED: ICD-10-CM

## 2023-09-12 PROCEDURE — 90686 IIV4 VACC NO PRSV 0.5 ML IM: CPT

## 2023-09-12 PROCEDURE — G0008: CPT

## 2023-09-12 PROCEDURE — 99214 OFFICE O/P EST MOD 30 MIN: CPT | Mod: 25

## 2023-09-12 PROCEDURE — 36415 COLL VENOUS BLD VENIPUNCTURE: CPT

## 2023-10-20 LAB
ALBUMIN SERPL ELPH-MCNC: 4.4 G/DL
ALP BLD-CCNC: 65 U/L
ALT SERPL-CCNC: 15 U/L
ANION GAP SERPL CALC-SCNC: 11 MMOL/L
AST SERPL-CCNC: 19 U/L
BASOPHILS # BLD AUTO: 0.04 K/UL
BASOPHILS NFR BLD AUTO: 0.6 %
BILIRUB SERPL-MCNC: 0.4 MG/DL
BUN SERPL-MCNC: 20 MG/DL
CALCIUM SERPL-MCNC: 9.4 MG/DL
CHLORIDE SERPL-SCNC: 102 MMOL/L
CHOLEST SERPL-MCNC: 247 MG/DL
CO2 SERPL-SCNC: 26 MMOL/L
CREAT SERPL-MCNC: 0.83 MG/DL
EGFR: 99 ML/MIN/1.73M2
EOSINOPHIL # BLD AUTO: 0.34 K/UL
EOSINOPHIL NFR BLD AUTO: 5.2 %
ESTIMATED AVERAGE GLUCOSE: 114 MG/DL
GLUCOSE SERPL-MCNC: 86 MG/DL
HBA1C MFR BLD HPLC: 5.6 %
HCT VFR BLD CALC: 42.2 %
HDLC SERPL-MCNC: 62 MG/DL
HGB BLD-MCNC: 13.8 G/DL
IMM GRANULOCYTES NFR BLD AUTO: 0.2 %
LDLC SERPL CALC-MCNC: 166 MG/DL
LYMPHOCYTES # BLD AUTO: 1.76 K/UL
LYMPHOCYTES NFR BLD AUTO: 27.2 %
MAN DIFF?: NORMAL
MCHC RBC-ENTMCNC: 29.4 PG
MCHC RBC-ENTMCNC: 32.7 GM/DL
MCV RBC AUTO: 89.8 FL
MONOCYTES # BLD AUTO: 0.75 K/UL
MONOCYTES NFR BLD AUTO: 11.6 %
NEUTROPHILS # BLD AUTO: 3.58 K/UL
NEUTROPHILS NFR BLD AUTO: 55.2 %
NONHDLC SERPL-MCNC: 185 MG/DL
PLATELET # BLD AUTO: 174 K/UL
POTASSIUM SERPL-SCNC: 4.7 MMOL/L
PROT SERPL-MCNC: 6.9 G/DL
RBC # BLD: 4.7 M/UL
RBC # FLD: 12.8 %
SODIUM SERPL-SCNC: 139 MMOL/L
TRIGL SERPL-MCNC: 110 MG/DL
WBC # FLD AUTO: 6.48 K/UL

## 2024-01-29 RX ORDER — DULOXETINE HYDROCHLORIDE 30 MG/1
30 CAPSULE, DELAYED RELEASE PELLETS ORAL
Qty: 90 | Refills: 3 | Status: ACTIVE | COMMUNITY
Start: 2023-08-29 | End: 1900-01-01

## 2024-01-29 RX ORDER — MELOXICAM 15 MG/1
15 TABLET ORAL
Qty: 30 | Refills: 3 | Status: ACTIVE | COMMUNITY
Start: 1900-01-01 | End: 1900-01-01

## 2024-03-11 PROBLEM — Z00.00 ENCOUNTER FOR PREVENTIVE HEALTH EXAMINATION: Status: ACTIVE | Noted: 2023-08-29

## 2024-03-11 PROBLEM — E78.5 HYPERLIPIDEMIA, UNSPECIFIED HYPERLIPIDEMIA TYPE: Status: ACTIVE | Noted: 2023-09-12

## 2024-03-11 PROBLEM — F41.8 DEPRESSION WITH ANXIETY: Status: ACTIVE | Noted: 2023-09-12

## 2024-03-11 NOTE — REASON FOR VISIT
Physical Exam    Vital Signs: I have reviewed the initial vital signs.  Constitutional: well-nourished, appears stated age, no acute distress  Eyes: Conjunctiva pink, Sclera clear,  Cardiovascular: S1 and S2, regular rate, regular rhythm, well-perfused extremities, radial pulses equal and 2+, calves nonttp, equal in size  Musculoskeletal: supple neck, no lower extremity edema, no midline tenderness, paraspinal tenderness, painful rom, moving all extremities appropriately, no gross bony deformities or swelling.  Integumentary: erythema and swelling along anterior aspect of 5th digit. Negative Knavel signs   Neurologic: awake, alert, cranial nerves II-XII grossly intact, extremities’ motor and sensory functions grossly intact [Annual Wellness Visit] : an annual wellness visit

## 2024-03-12 ENCOUNTER — NON-APPOINTMENT (OUTPATIENT)
Age: 64
End: 2024-03-12

## 2024-03-12 ENCOUNTER — TRANSCRIPTION ENCOUNTER (OUTPATIENT)
Age: 64
End: 2024-03-12

## 2024-03-12 ENCOUNTER — APPOINTMENT (OUTPATIENT)
Dept: INTERNAL MEDICINE | Facility: CLINIC | Age: 64
End: 2024-03-12
Payer: MEDICARE

## 2024-03-12 VITALS
HEART RATE: 62 BPM | BODY MASS INDEX: 26.95 KG/M2 | WEIGHT: 199 LBS | SYSTOLIC BLOOD PRESSURE: 137 MMHG | HEIGHT: 72 IN | DIASTOLIC BLOOD PRESSURE: 83 MMHG | RESPIRATION RATE: 12 BRPM | OXYGEN SATURATION: 97 %

## 2024-03-12 DIAGNOSIS — E78.5 HYPERLIPIDEMIA, UNSPECIFIED: ICD-10-CM

## 2024-03-12 DIAGNOSIS — Z78.9 OTHER SPECIFIED HEALTH STATUS: ICD-10-CM

## 2024-03-12 DIAGNOSIS — F41.8 OTHER SPECIFIED ANXIETY DISORDERS: ICD-10-CM

## 2024-03-12 DIAGNOSIS — G12.23 PRIMARY LATERAL SCLEROSIS: ICD-10-CM

## 2024-03-12 DIAGNOSIS — Z82.49 FAMILY HISTORY OF ISCHEMIC HEART DISEASE AND OTHER DISEASES OF THE CIRCULATORY SYSTEM: ICD-10-CM

## 2024-03-12 DIAGNOSIS — M51.26 OTHER INTERVERTEBRAL DISC DISPLACEMENT, LUMBAR REGION: ICD-10-CM

## 2024-03-12 DIAGNOSIS — Z80.0 FAMILY HISTORY OF MALIGNANT NEOPLASM OF DIGESTIVE ORGANS: ICD-10-CM

## 2024-03-12 DIAGNOSIS — Z00.00 ENCOUNTER FOR GENERAL ADULT MEDICAL EXAMINATION W/OUT ABNORMAL FINDINGS: ICD-10-CM

## 2024-03-12 DIAGNOSIS — M79.673 PAIN IN UNSPECIFIED FOOT: ICD-10-CM

## 2024-03-12 PROCEDURE — 36415 COLL VENOUS BLD VENIPUNCTURE: CPT

## 2024-03-12 PROCEDURE — 93000 ELECTROCARDIOGRAM COMPLETE: CPT

## 2024-03-12 PROCEDURE — G0439: CPT

## 2024-03-12 RX ORDER — TADALAFIL 20 MG/1
20 TABLET ORAL
Qty: 30 | Refills: 5 | Status: ACTIVE | COMMUNITY
Start: 2023-08-29 | End: 1900-01-01

## 2024-03-12 NOTE — HEALTH RISK ASSESSMENT
[Excellent] : ~his/her~  mood as  excellent [No] : In the past 12 months have you used drugs other than those required for medical reasons? No [0] : 2) Feeling down, depressed, or hopeless: Not at all (0) [PHQ-2 Negative - No further assessment needed] : PHQ-2 Negative - No further assessment needed [Fully functional (bathing, dressing, toileting, transferring, walking, feeding)] : Fully functional (bathing, dressing, toileting, transferring, walking, feeding) [Fully functional (using the telephone, shopping, preparing meals, housekeeping, doing laundry, using] : Fully functional and needs no help or supervision to perform IADLs (using the telephone, shopping, preparing meals, housekeeping, doing laundry, using transportation, managing medications and managing finances) [Carbon Monoxide Detector] : carbon monoxide detector [Smoke Detector] : smoke detector [Sunscreen] : uses sunscreen [Seat Belt] :  uses seat belt [Never] : Never [Patient reported colonoscopy was normal] : Patient reported colonoscopy was normal [None] : None [With Family] : lives with family [Retired] : retired [] :  [# Of Children ___] : has [unfilled] children [Sexually Active] : sexually active [No falls in past year] : Patient reported no falls in the past year [[id number]] : 0 [Change in mental status noted] : No change in mental status noted [High Risk Behavior] : no high risk behavior [Reports changes in hearing] : Reports no changes in hearing [Reports changes in dental health] : Reports no changes in dental health [Reports changes in vision] : Reports no changes in vision [ColonoscopyComments] : mother - colon cancer [ColonoscopyDate] : 09/22

## 2024-03-12 NOTE — HISTORY OF PRESENT ILLNESS
[FreeTextEntry1] : physical  [de-identified] : Patient comes for an annual exam.  He complains of left foot pain. He is concerned for gout.  He never saw neuromuscular specialist for primary lateral sclerosis. Prior saw neuro Dr. Miryam Wilson in [place].

## 2024-03-12 NOTE — PLAN
[FreeTextEntry1] : Check routine fasting labs. Discussed diet, exercise, and weight maintenance. EKG NSR. Colonoscopy UTD. Mother with colon cancer. Vaccines UTD. Prostate cancer screening with PSA. HLD - check lipids, CMP, and A1c. Watch diet. Depression w/ anxiety - stable on Cymbalta. LBP due to herniated disc - Mobic PRN. Primary lateral sclerosis - referred again to neuromuscular specialist.  Left foot pain - not suggestive of gout. Check uric acid level. ED - Cialis renewed.  RTO in 1 yr for annual exam or earlier PRN for acute care.

## 2024-03-12 NOTE — PHYSICAL EXAM
[Soft] : abdomen soft [No Edema] : there was no peripheral edema [No Rash] : no rash [Non Tender] : non-tender [Normal Gait] : normal gait [Normal Mood] : the mood was normal [Normal] : the outer ears and nose were normal in appearance and the oropharynx was normal [Supple] : supple [No Lymphadenopathy] : no lymphadenopathy [Pedal Pulses Present] : the pedal pulses are present [Non-distended] : non-distended [Normal Bowel Sounds] : normal bowel sounds [Normal Posterior Cervical Nodes] : no posterior cervical lymphadenopathy [Normal Anterior Cervical Nodes] : no anterior cervical lymphadenopathy [No CVA Tenderness] : no CVA  tenderness [No Spinal Tenderness] : no spinal tenderness [Normal Affect] : the affect was normal [No Joint Swelling] : no joint swelling [de-identified] : friendly [de-identified] : left foot - no erythema/warmth/tenderness [de-identified] : unsteady gait using cane

## 2024-03-17 ENCOUNTER — TRANSCRIPTION ENCOUNTER (OUTPATIENT)
Age: 64
End: 2024-03-17

## 2024-03-17 DIAGNOSIS — R73.03 PREDIABETES.: ICD-10-CM

## 2024-03-17 LAB
ALBUMIN SERPL ELPH-MCNC: 4.4 G/DL
ALP BLD-CCNC: 68 U/L
ALT SERPL-CCNC: 19 U/L
ANION GAP SERPL CALC-SCNC: 11 MMOL/L
AST SERPL-CCNC: 22 U/L
BILIRUB SERPL-MCNC: 0.4 MG/DL
BUN SERPL-MCNC: 15 MG/DL
CALCIUM SERPL-MCNC: 9.3 MG/DL
CHLORIDE SERPL-SCNC: 100 MMOL/L
CHOLEST SERPL-MCNC: 235 MG/DL
CO2 SERPL-SCNC: 26 MMOL/L
CREAT SERPL-MCNC: 0.82 MG/DL
EGFR: 99 ML/MIN/1.73M2
ESTIMATED AVERAGE GLUCOSE: 120 MG/DL
GLUCOSE SERPL-MCNC: 96 MG/DL
HBA1C MFR BLD HPLC: 5.8 %
HCT VFR BLD CALC: 44 %
HDLC SERPL-MCNC: 66 MG/DL
HGB BLD-MCNC: 14.1 G/DL
LDLC SERPL CALC-MCNC: 149 MG/DL
MCHC RBC-ENTMCNC: 29.1 PG
MCHC RBC-ENTMCNC: 32 GM/DL
MCV RBC AUTO: 90.7 FL
NONHDLC SERPL-MCNC: 169 MG/DL
PLATELET # BLD AUTO: 159 K/UL
POTASSIUM SERPL-SCNC: 4.3 MMOL/L
PROT SERPL-MCNC: 7 G/DL
PSA SERPL-MCNC: 1.18 NG/ML
RBC # BLD: 4.85 M/UL
RBC # FLD: 13.2 %
SODIUM SERPL-SCNC: 137 MMOL/L
TRIGL SERPL-MCNC: 115 MG/DL
TSH SERPL-ACNC: 1.25 UIU/ML
URATE SERPL-MCNC: 3.9 MG/DL
VIT B12 SERPL-MCNC: 583 PG/ML
WBC # FLD AUTO: 5.19 K/UL

## 2024-09-16 ENCOUNTER — APPOINTMENT (OUTPATIENT)
Dept: INTERNAL MEDICINE | Facility: CLINIC | Age: 64
End: 2024-09-16
Payer: MEDICARE

## 2024-09-16 VITALS
OXYGEN SATURATION: 99 % | DIASTOLIC BLOOD PRESSURE: 70 MMHG | RESPIRATION RATE: 12 BRPM | SYSTOLIC BLOOD PRESSURE: 120 MMHG | HEART RATE: 62 BPM | BODY MASS INDEX: 26.28 KG/M2 | WEIGHT: 194 LBS | HEIGHT: 72 IN

## 2024-09-16 DIAGNOSIS — E53.8 DEFICIENCY OF OTHER SPECIFIED B GROUP VITAMINS: ICD-10-CM

## 2024-09-16 DIAGNOSIS — E78.5 HYPERLIPIDEMIA, UNSPECIFIED: ICD-10-CM

## 2024-09-16 DIAGNOSIS — R73.03 PREDIABETES.: ICD-10-CM

## 2024-09-16 DIAGNOSIS — G12.23 PRIMARY LATERAL SCLEROSIS: ICD-10-CM

## 2024-09-16 DIAGNOSIS — F41.8 OTHER SPECIFIED ANXIETY DISORDERS: ICD-10-CM

## 2024-09-16 DIAGNOSIS — Z23 ENCOUNTER FOR IMMUNIZATION: ICD-10-CM

## 2024-09-16 PROCEDURE — G2211 COMPLEX E/M VISIT ADD ON: CPT

## 2024-09-16 PROCEDURE — 99214 OFFICE O/P EST MOD 30 MIN: CPT

## 2024-09-16 PROCEDURE — 36415 COLL VENOUS BLD VENIPUNCTURE: CPT

## 2024-09-16 PROCEDURE — G0008: CPT

## 2024-09-16 PROCEDURE — 90656 IIV3 VACC NO PRSV 0.5 ML IM: CPT

## 2024-09-16 RX ORDER — MAGNESIUM 200 MG
1000 TABLET ORAL DAILY
Qty: 90 | Refills: 0 | Status: ACTIVE | COMMUNITY

## 2024-09-16 RX ORDER — TIZANIDINE 2 MG/1
2 TABLET ORAL EVERY 6 HOURS
Qty: 30 | Refills: 0 | Status: DISCONTINUED | COMMUNITY
End: 2024-09-16

## 2024-09-16 NOTE — HEALTH RISK ASSESSMENT
[0] : 2) Feeling down, depressed, or hopeless: Not at all (0) [PHQ-2 Negative - No further assessment needed] : PHQ-2 Negative - No further assessment needed [Never] : Never [[id number]] : 0

## 2024-09-16 NOTE — HISTORY OF PRESENT ILLNESS
[FreeTextEntry1] : HLD, borderline DM [de-identified] : Patient comes for 6 month follow up visit and fasting labs.  He reports feeling well. No major complaints. Compliant with Cymbalta. He has been seeing neurology Dr. Hunter for primary lateral sclerosis. He tried Tizanidine but did not provide relief. He would like Rx for PT.

## 2024-09-16 NOTE — PHYSICAL EXAM
[Normal] : normal rate, regular rhythm, normal S1 and S2 and no murmur heard [No Edema] : there was no peripheral edema [Soft] : abdomen soft [Non Tender] : non-tender [No Rash] : no rash [Normal Gait] : normal gait [Normal Mood] : the mood was normal [de-identified] : friendly [de-identified] : unsteady gait using cane

## 2024-09-16 NOTE — PLAN
[FreeTextEntry1] : HLD / borderline DM - check lipids, CMP, and A1c. Continue healthy low fat diet. Depression w/ anxiety - stable, continue Cymbalta. Rx renewed. Rx given for PT. Followed with neurology for primary lateral sclerosis. Flu shot given today.  RTO 6 months for physical.

## 2024-09-17 ENCOUNTER — TRANSCRIPTION ENCOUNTER (OUTPATIENT)
Age: 64
End: 2024-09-17

## 2024-09-17 LAB
ALBUMIN SERPL ELPH-MCNC: 4.4 G/DL
ALP BLD-CCNC: 68 U/L
ALT SERPL-CCNC: 10 U/L
ANION GAP SERPL CALC-SCNC: 9 MMOL/L
AST SERPL-CCNC: 18 U/L
BILIRUB SERPL-MCNC: 0.5 MG/DL
BUN SERPL-MCNC: 20 MG/DL
CALCIUM SERPL-MCNC: 9.2 MG/DL
CHLORIDE SERPL-SCNC: 105 MMOL/L
CHOLEST SERPL-MCNC: 206 MG/DL
CO2 SERPL-SCNC: 26 MMOL/L
CREAT SERPL-MCNC: 0.73 MG/DL
EGFR: 102 ML/MIN/1.73M2
ESTIMATED AVERAGE GLUCOSE: 120 MG/DL
GLUCOSE SERPL-MCNC: 94 MG/DL
HBA1C MFR BLD HPLC: 5.8 %
HDLC SERPL-MCNC: 60 MG/DL
LDLC SERPL CALC-MCNC: 132 MG/DL
NONHDLC SERPL-MCNC: 146 MG/DL
POTASSIUM SERPL-SCNC: 4.7 MMOL/L
PROT SERPL-MCNC: 6.7 G/DL
SODIUM SERPL-SCNC: 140 MMOL/L
TRIGL SERPL-MCNC: 77 MG/DL
VIT B12 SERPL-MCNC: 953 PG/ML

## 2024-09-18 NOTE — PROGRESS NOTE ADULT - ASSESSMENT
62M with herniated discs, primary lateral sclerosis (ambulates with a cane) who presents with abdominal pain.   Found to have diverticulitis on CT.      Diverticulitis   - appendicitis unlikely as per surgery  - surgery input appreciated   - cont with zosyn for now  - Will need 10-14 day course of po abx   - Tolerating diet   - pain control    Incidental findings   - Cystic dilatation of the seminal vesicles noted on imaging  - Outpatient PCP/urology follow up. Explained to patient in detail and provided report of imaging on discharge    Back pain  - cont with cymbalta  - tylenol PRN    Preventive measures  - Heparin bid  62M with herniated discs, primary lateral sclerosis (ambulates with a cane) who presents with abdominal pain.   Found to have diverticulitis on CT.      Diverticulitis   - appendicitis unlikely as per surgery  - surgery input appreciated   - cont with zosyn for now  - Will need 10-14 day course of po abx   - Tolerating diet   - pain control    Incidental findings   - Cystic dilatation of the seminal vesicles noted on imaging  - Outpatient PCP/urology follow up. Explained to patient in detail and provided report of imaging on discharge    Back pain  - cont with cymbalta  - tylenol PRN  - Outpatient follow up with orthopedics    Preventive measures  - Heparin bid  18-Sep-2024 19:21

## 2024-11-27 ENCOUNTER — APPOINTMENT (OUTPATIENT)
Dept: INTERNAL MEDICINE | Facility: CLINIC | Age: 64
End: 2024-11-27

## 2025-01-07 ENCOUNTER — APPOINTMENT (OUTPATIENT)
Dept: ORTHOPEDIC SURGERY | Facility: CLINIC | Age: 65
End: 2025-01-07
Payer: MEDICARE

## 2025-01-07 VITALS — BODY MASS INDEX: 25.73 KG/M2 | WEIGHT: 190 LBS | HEIGHT: 72 IN

## 2025-01-07 DIAGNOSIS — G12.23 PRIMARY LATERAL SCLEROSIS: ICD-10-CM

## 2025-01-07 DIAGNOSIS — M65.311 TRIGGER THUMB, RIGHT THUMB: ICD-10-CM

## 2025-01-07 PROCEDURE — 99203 OFFICE O/P NEW LOW 30 MIN: CPT | Mod: 25

## 2025-01-07 PROCEDURE — 20550 NJX 1 TENDON SHEATH/LIGAMENT: CPT | Mod: RT

## 2025-03-10 ENCOUNTER — NON-APPOINTMENT (OUTPATIENT)
Age: 65
End: 2025-03-10

## 2025-03-10 ENCOUNTER — APPOINTMENT (OUTPATIENT)
Dept: INTERNAL MEDICINE | Facility: CLINIC | Age: 65
End: 2025-03-10
Payer: MEDICARE

## 2025-03-10 VITALS
BODY MASS INDEX: 26.82 KG/M2 | OXYGEN SATURATION: 97 % | TEMPERATURE: 98.2 F | DIASTOLIC BLOOD PRESSURE: 70 MMHG | SYSTOLIC BLOOD PRESSURE: 130 MMHG | HEIGHT: 72 IN | HEART RATE: 63 BPM | WEIGHT: 198 LBS

## 2025-03-10 DIAGNOSIS — E78.5 HYPERLIPIDEMIA, UNSPECIFIED: ICD-10-CM

## 2025-03-10 DIAGNOSIS — G12.23 PRIMARY LATERAL SCLEROSIS: ICD-10-CM

## 2025-03-10 DIAGNOSIS — F41.8 OTHER SPECIFIED ANXIETY DISORDERS: ICD-10-CM

## 2025-03-10 DIAGNOSIS — R73.03 PREDIABETES.: ICD-10-CM

## 2025-03-10 DIAGNOSIS — Z00.00 ENCOUNTER FOR GENERAL ADULT MEDICAL EXAMINATION W/OUT ABNORMAL FINDINGS: ICD-10-CM

## 2025-03-10 PROCEDURE — G0439: CPT

## 2025-03-10 PROCEDURE — 93000 ELECTROCARDIOGRAM COMPLETE: CPT

## 2025-03-10 PROCEDURE — 36415 COLL VENOUS BLD VENIPUNCTURE: CPT

## 2025-03-10 RX ORDER — GABAPENTIN 300 MG/1
300 CAPSULE ORAL
Qty: 30 | Refills: 3 | Status: ACTIVE | COMMUNITY
Start: 2025-03-10 | End: 1900-01-01

## 2025-03-16 LAB
ALBUMIN SERPL ELPH-MCNC: 4.3 G/DL
ALP BLD-CCNC: 73 U/L
ALT SERPL-CCNC: 14 U/L
ANION GAP SERPL CALC-SCNC: 12 MMOL/L
AST SERPL-CCNC: 21 U/L
BASOPHILS # BLD AUTO: 0.04 K/UL
BASOPHILS NFR BLD AUTO: 0.6 %
BILIRUB SERPL-MCNC: 0.5 MG/DL
BUN SERPL-MCNC: 21 MG/DL
CALCIUM SERPL-MCNC: 9.2 MG/DL
CHLORIDE SERPL-SCNC: 102 MMOL/L
CHOLEST SERPL-MCNC: 213 MG/DL
CO2 SERPL-SCNC: 25 MMOL/L
CREAT SERPL-MCNC: 0.74 MG/DL
EGFRCR SERPLBLD CKD-EPI 2021: 101 ML/MIN/1.73M2
EOSINOPHIL # BLD AUTO: 0.27 K/UL
EOSINOPHIL NFR BLD AUTO: 4.4 %
ESTIMATED AVERAGE GLUCOSE: 117 MG/DL
GLUCOSE SERPL-MCNC: 92 MG/DL
HBA1C MFR BLD HPLC: 5.7 %
HCT VFR BLD CALC: 45 %
HDLC SERPL-MCNC: 66 MG/DL
HGB BLD-MCNC: 13.9 G/DL
IMM GRANULOCYTES NFR BLD AUTO: 0.3 %
LDLC SERPL CALC-MCNC: 134 MG/DL
LYMPHOCYTES # BLD AUTO: 1.54 K/UL
LYMPHOCYTES NFR BLD AUTO: 24.9 %
MAN DIFF?: NORMAL
MCHC RBC-ENTMCNC: 29.6 PG
MCHC RBC-ENTMCNC: 30.9 G/DL
MCV RBC AUTO: 95.7 FL
MONOCYTES # BLD AUTO: 0.71 K/UL
MONOCYTES NFR BLD AUTO: 11.5 %
NEUTROPHILS # BLD AUTO: 3.61 K/UL
NEUTROPHILS NFR BLD AUTO: 58.3 %
NONHDLC SERPL-MCNC: 147 MG/DL
PLATELET # BLD AUTO: 176 K/UL
POTASSIUM SERPL-SCNC: 4.9 MMOL/L
PROT SERPL-MCNC: 6.8 G/DL
PSA SERPL-MCNC: 1.18 NG/ML
RBC # BLD: 4.7 M/UL
RBC # FLD: 13.2 %
SODIUM SERPL-SCNC: 138 MMOL/L
TRIGL SERPL-MCNC: 76 MG/DL
TSH SERPL-ACNC: 1.17 UIU/ML
VIT B12 SERPL-MCNC: 813 PG/ML
WBC # FLD AUTO: 6.19 K/UL

## 2025-03-17 ENCOUNTER — APPOINTMENT (OUTPATIENT)
Dept: INTERNAL MEDICINE | Facility: CLINIC | Age: 65
End: 2025-03-17

## 2025-05-30 ENCOUNTER — APPOINTMENT (OUTPATIENT)
Dept: ORTHOPEDIC SURGERY | Facility: CLINIC | Age: 65
End: 2025-05-30

## 2025-05-30 VITALS — HEIGHT: 72 IN | BODY MASS INDEX: 26.82 KG/M2 | WEIGHT: 198 LBS

## 2025-05-30 DIAGNOSIS — M65.311 TRIGGER THUMB, RIGHT THUMB: ICD-10-CM

## 2025-05-30 PROCEDURE — 20550 NJX 1 TENDON SHEATH/LIGAMENT: CPT | Mod: RT

## 2025-05-30 PROCEDURE — 99213 OFFICE O/P EST LOW 20 MIN: CPT | Mod: 25
